# Patient Record
Sex: MALE | Race: WHITE
[De-identification: names, ages, dates, MRNs, and addresses within clinical notes are randomized per-mention and may not be internally consistent; named-entity substitution may affect disease eponyms.]

---

## 2019-09-05 ENCOUNTER — HOSPITAL ENCOUNTER (INPATIENT)
Dept: HOSPITAL 54 - ER | Age: 69
LOS: 4 days | Discharge: PSYCHIATRIC OR SUBSTANCE USE FACILITY | DRG: 689 | End: 2019-09-09
Attending: NURSE PRACTITIONER | Admitting: INTERNAL MEDICINE
Payer: MEDICARE

## 2019-09-05 VITALS — DIASTOLIC BLOOD PRESSURE: 46 MMHG | SYSTOLIC BLOOD PRESSURE: 87 MMHG

## 2019-09-05 VITALS — SYSTOLIC BLOOD PRESSURE: 120 MMHG | DIASTOLIC BLOOD PRESSURE: 72 MMHG

## 2019-09-05 VITALS — WEIGHT: 161.06 LBS | HEIGHT: 70 IN | BODY MASS INDEX: 23.06 KG/M2

## 2019-09-05 VITALS — SYSTOLIC BLOOD PRESSURE: 94 MMHG | DIASTOLIC BLOOD PRESSURE: 57 MMHG

## 2019-09-05 DIAGNOSIS — S00.83XA: ICD-10-CM

## 2019-09-05 DIAGNOSIS — F05: ICD-10-CM

## 2019-09-05 DIAGNOSIS — E51.9: ICD-10-CM

## 2019-09-05 DIAGNOSIS — I25.10: ICD-10-CM

## 2019-09-05 DIAGNOSIS — T31.0: ICD-10-CM

## 2019-09-05 DIAGNOSIS — G93.41: ICD-10-CM

## 2019-09-05 DIAGNOSIS — F19.10: ICD-10-CM

## 2019-09-05 DIAGNOSIS — Z87.891: ICD-10-CM
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
## 2019-09-05 DIAGNOSIS — Z79.899: ICD-10-CM

## 2019-09-05 DIAGNOSIS — D69.6: ICD-10-CM

## 2019-09-05 DIAGNOSIS — F31.9: ICD-10-CM

## 2019-09-05 DIAGNOSIS — T67.0XXS: ICD-10-CM

## 2019-09-05 DIAGNOSIS — B96.1: ICD-10-CM

## 2019-09-05 DIAGNOSIS — Z86.73: ICD-10-CM

## 2019-09-05 DIAGNOSIS — K21.9: ICD-10-CM

## 2019-09-05 DIAGNOSIS — F03.91: ICD-10-CM

## 2019-09-05 DIAGNOSIS — I10: ICD-10-CM

## 2019-09-05 DIAGNOSIS — Z73.6: ICD-10-CM
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
## 2019-09-05 DIAGNOSIS — L89.159: ICD-10-CM

## 2019-09-05 DIAGNOSIS — Z82.49: ICD-10-CM

## 2019-09-05 DIAGNOSIS — N39.0: Primary | ICD-10-CM
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
## 2019-09-05 DIAGNOSIS — T25.029S: ICD-10-CM

## 2019-09-05 LAB
ALBUMIN SERPL BCP-MCNC: 2.6 G/DL (ref 3.4–5)
ALP SERPL-CCNC: 81 U/L (ref 46–116)
ALT SERPL W P-5'-P-CCNC: 32 U/L (ref 12–78)
APPEARANCE UR: (no result)
AST SERPL W P-5'-P-CCNC: 32 U/L (ref 15–37)
BASOPHILS # BLD AUTO: 0.1 /CMM (ref 0–0.2)
BASOPHILS NFR BLD AUTO: 1.1 % (ref 0–2)
BILIRUB DIRECT SERPL-MCNC: 0.3 MG/DL (ref 0–0.2)
BILIRUB SERPL-MCNC: 0.8 MG/DL (ref 0.2–1)
BILIRUB UR QL STRIP: (no result)
BUN SERPL-MCNC: 9 MG/DL (ref 7–18)
CALCIUM SERPL-MCNC: 8.6 MG/DL (ref 8.5–10.1)
CHLORIDE SERPL-SCNC: 103 MMOL/L (ref 98–107)
CO2 SERPL-SCNC: 26 MMOL/L (ref 21–32)
COLOR UR: (no result)
CREAT SERPL-MCNC: 1.1 MG/DL (ref 0.6–1.3)
EOSINOPHIL NFR BLD AUTO: 0.5 % (ref 0–6)
ETHANOL SERPL-MCNC: < 3 MG/DL (ref 0–0)
GLUCOSE SERPL-MCNC: 102 MG/DL (ref 74–106)
GLUCOSE UR STRIP-MCNC: NEGATIVE MG/DL
HCT VFR BLD AUTO: 39 % (ref 39–51)
HGB BLD-MCNC: 12.9 G/DL (ref 13.5–17.5)
HGB UR QL STRIP: (no result) ERY/UL
KETONES UR STRIP-MCNC: (no result) MG/DL
LEUKOCYTE ESTERASE UR QL STRIP: (no result)
LYMPHOCYTES NFR BLD AUTO: 1.3 /CMM (ref 0.8–4.8)
LYMPHOCYTES NFR BLD AUTO: 14.5 % (ref 20–44)
MCHC RBC AUTO-ENTMCNC: 34 G/DL (ref 31–36)
MCV RBC AUTO: 90 FL (ref 80–96)
MONOCYTES NFR BLD AUTO: 0.9 /CMM (ref 0.1–1.3)
MONOCYTES NFR BLD AUTO: 9.8 % (ref 2–12)
NEUTROPHILS # BLD AUTO: 6.5 /CMM (ref 1.8–8.9)
NEUTROPHILS NFR BLD AUTO: 74.1 % (ref 43–81)
NITRITE UR QL STRIP: POSITIVE
PH UR STRIP: 6 [PH] (ref 5–8)
PLATELET # BLD AUTO: 170 /CMM (ref 150–450)
POTASSIUM SERPL-SCNC: 4.1 MMOL/L (ref 3.5–5.1)
PROT SERPL-MCNC: 6.4 G/DL (ref 6.4–8.2)
PROT UR QL STRIP: (no result) MG/DL
RBC # BLD AUTO: 4.3 MIL/UL (ref 4.5–6)
RBC #/AREA URNS HPF: (no result) /HPF (ref 0–2)
SODIUM SERPL-SCNC: 138 MMOL/L (ref 136–145)
UROBILINOGEN UR STRIP-MCNC: 0.2 EU/DL
WBC #/AREA URNS HPF: (no result) /HPF (ref 0–3)
WBC NRBC COR # BLD AUTO: 8.8 K/UL (ref 4.3–11)

## 2019-09-05 PROCEDURE — A6253 ABSORPT DRG > 48 SQ IN W/O B: HCPCS

## 2019-09-05 PROCEDURE — A4216 STERILE WATER/SALINE, 10 ML: HCPCS

## 2019-09-05 PROCEDURE — A6403 STERILE GAUZE>16 <= 48 SQ IN: HCPCS

## 2019-09-05 PROCEDURE — G0480 DRUG TEST DEF 1-7 CLASSES: HCPCS

## 2019-09-05 PROCEDURE — G0378 HOSPITAL OBSERVATION PER HR: HCPCS

## 2019-09-05 RX ADMIN — AMLODIPINE BESYLATE SCH MG: 10 TABLET ORAL at 15:30

## 2019-09-05 RX ADMIN — FAMOTIDINE SCH MG: 20 TABLET, FILM COATED ORAL at 16:13

## 2019-09-05 RX ADMIN — PANTOPRAZOLE SODIUM SCH MG: 40 TABLET, DELAYED RELEASE ORAL at 07:30

## 2019-09-05 RX ADMIN — Medication SCH MG: at 16:13

## 2019-09-05 RX ADMIN — Medication SCH MG: at 09:00

## 2019-09-05 RX ADMIN — VALPROIC ACID SCH MG: 250 SOLUTION ORAL at 16:12

## 2019-09-05 RX ADMIN — ALBUTEROL SULFATE SCH MG: 2.5 SOLUTION RESPIRATORY (INHALATION) at 19:30

## 2019-09-05 RX ADMIN — FOLIC ACID SCH MG: 1 TABLET ORAL at 16:12

## 2019-09-05 NOTE — NUR
MS RN NOTE



RECEIVED PT IN STABLE CONDITION, A/O X1, IN ROOM WITH WIFE AND 1:1 SITTER. PT CURRENTLY 
AGITATED, AND WALKING AROUND ROOM. NO SIGNS OF SOB OR DISTRESS, NO INDICATIONS OF PAIN. PER 
REPORT FROM RN, PT DOES NOT HAVE IV ACCESS, MD AWARE. ALL CURRENT NEEDS MET. PTS. BED 
REMAINS LOW, LOCKED, SAFETY PRECAUTIONS IN PLACE. WILL CONT. TO MONITOR.

## 2019-09-05 NOTE — NUR
MS RN NOTES

PATIENT BECOMES AGITATED AND IV ACCESS GOT PULLED OUT ACCIDENTALLY BY PATIENT AND UNABLE TO 
INSERT NEW IV  ACCESS AT THIS TIME NOTIFIED DR CLARICE DURÁN WITH NEW ORDERS FOR HALDOL 
LACTATE 0.5 MG IM ONCE. ORDER READ BACK COMPLETE. NOTED AND CARRIED OUT.

## 2019-09-05 NOTE — NUR
RN medsurg admission notes

Pt is arrived at the unit with a gurney from ER. Pt is 69 Y O Male with a diagnose of 
Mechanical fall by Dr. Henriquez. Pt is altered. Pt's wife at the bedside. Respiration is 
normal. No SOB. No S/S of distress noted. IV sites at Right forearm # 20 is clean, intact 
and flush without resistance. Skin assessment done and pictures taken and placed at Pt's 
chart. Pt's belonging checked by EDWARD Collins. Contact precautions is maintained for C-Diff. 
Safety precautions is maintained. Bed at low position, brakes locked, side rails upX3, and 
call light is within reach. Will continue to monitor.

## 2019-09-05 NOTE — NUR
MS RN NOTE



PER PT WIFE, HALDOL DOES NOT WORK FOR PT. SHE DOES NOT WANT TO GIVE THE HALDOL. RISKS AND 
BENEFITS MADE AWARE, WITH VERBALIZATION OF UNDERSTANDING.

## 2019-09-05 NOTE — NUR
RN medsurg closing notes

Pt is resting in bed comfortably. Pt's wife at the bed side. Respiration is normal. No SOB. 
No S/S of distress noted. IV sites at R forearm is clean, intact, patent, and SL. Contact 
precautions is maintained for C-Diff. Safety precautions is maintained. Bed at low position, 
call light is within reach. Will endorse to morning nurse for ASHANTI.

## 2019-09-05 NOTE — NUR
MS RN NOTES

PATIENT IN BED EYES CLOSED, AROUSE TO TACTILE STIMULI. NO ACUTE DISTRESS NOTED, BREATHING 
UNLABORED. IV ACCESS PATENT AND INTACT, NO REDNESS OR SWELLING NOTED. SAFETY MEASURES IN 
PLACE. CALL LIGHT WITHIN REACH WILL CONTINUE TO MONITOR ACCORDINGLY.

## 2019-09-05 NOTE — NUR
MS RN NOTES

WIFE BRITTANY AT BEDSIDE REFUSED HALDOL ADMINISTRATION FOR NOW DESPITE OF EXPLANATION OF 
RISKS AND BENEFITS.

## 2019-09-05 NOTE — NUR
MS RN NOTE



SPOKE WITH BRITTANY (WIFE), CONTINUES TO REFUSE HALDOL ADMINISTRATION. RISKS AND BENEFITS 
MADE AWARE WITH VERBALIZATION OF UNDERSTANDING.

## 2019-09-05 NOTE — NUR
MS RN NOTES

PATIENT SLEEPING AROUSE TO TACTILE STIMULI, OFFERED MEDICATION DUE, PATIENT TOO SLEEPY WENT 
BACK TO BED.

## 2019-09-05 NOTE — NUR
MS RN NOTES

PATIENT LYING IN BED, SITTER AND WIFE AT BED STILL AGITATED. NO ACUTE DISTRESS NOTED, 
BREATHING UNLABORED. NO IV ACCESS AT THIS TIME, DR CLARICE DURÁN AWARE. SAFETY MEASURES IN 
PLACE. CALL LIGHT WITHIN REACH. WILL CONTINUE TO MONITOR ACCORDINGLY AND WILL ENDORSE TO 
NIGHT NURSE FOR CONTINUITY OF CARE.

## 2019-09-06 LAB
BASOPHILS # BLD AUTO: 0.1 /CMM (ref 0–0.2)
BASOPHILS NFR BLD AUTO: 0.8 % (ref 0–2)
BUN SERPL-MCNC: 14 MG/DL (ref 7–18)
CALCIUM SERPL-MCNC: 8.6 MG/DL (ref 8.5–10.1)
CHLORIDE SERPL-SCNC: 104 MMOL/L (ref 98–107)
CHOLEST SERPL-MCNC: 126 MG/DL (ref ?–200)
CO2 SERPL-SCNC: 27 MMOL/L (ref 21–32)
CREAT SERPL-MCNC: 0.8 MG/DL (ref 0.6–1.3)
EOSINOPHIL NFR BLD AUTO: 2 % (ref 0–6)
GLUCOSE SERPL-MCNC: 92 MG/DL (ref 74–106)
HCT VFR BLD AUTO: 38 % (ref 39–51)
HDLC SERPL-MCNC: 16 MG/DL (ref 40–60)
HGB BLD-MCNC: 12.8 G/DL (ref 13.5–17.5)
LDLC SERPL DIRECT ASSAY-MCNC: 96 MG/DL (ref 0–99)
LYMPHOCYTES NFR BLD AUTO: 1.6 /CMM (ref 0.8–4.8)
LYMPHOCYTES NFR BLD AUTO: 25.9 % (ref 20–44)
MAGNESIUM SERPL-MCNC: 2.3 MG/DL (ref 1.8–2.4)
MCHC RBC AUTO-ENTMCNC: 34 G/DL (ref 31–36)
MCV RBC AUTO: 90 FL (ref 80–96)
MONOCYTES NFR BLD AUTO: 0.7 /CMM (ref 0.1–1.3)
MONOCYTES NFR BLD AUTO: 11.7 % (ref 2–12)
NEUTROPHILS # BLD AUTO: 3.6 /CMM (ref 1.8–8.9)
NEUTROPHILS NFR BLD AUTO: 59.6 % (ref 43–81)
PHOSPHATE SERPL-MCNC: 3.8 MG/DL (ref 2.5–4.9)
PLATELET # BLD AUTO: 190 /CMM (ref 150–450)
POTASSIUM SERPL-SCNC: 4 MMOL/L (ref 3.5–5.1)
RBC # BLD AUTO: 4.28 MIL/UL (ref 4.5–6)
SODIUM SERPL-SCNC: 140 MMOL/L (ref 136–145)
TRIGL SERPL-MCNC: 154 MG/DL (ref 30–150)
TSH SERPL DL<=0.005 MIU/L-ACNC: 1.16 UIU/ML (ref 0.36–3.74)
WBC NRBC COR # BLD AUTO: 6 K/UL (ref 4.3–11)

## 2019-09-06 RX ADMIN — VALPROIC ACID SCH MG: 250 SOLUTION ORAL at 08:33

## 2019-09-06 RX ADMIN — Medication SCH MG: at 13:48

## 2019-09-06 RX ADMIN — PANTOPRAZOLE SODIUM SCH MG: 40 TABLET, DELAYED RELEASE ORAL at 08:33

## 2019-09-06 RX ADMIN — FAMOTIDINE SCH MG: 20 TABLET, FILM COATED ORAL at 08:32

## 2019-09-06 RX ADMIN — VALPROIC ACID SCH MG: 250 SOLUTION ORAL at 16:58

## 2019-09-06 RX ADMIN — FOLIC ACID SCH MG: 1 TABLET ORAL at 08:33

## 2019-09-06 RX ADMIN — Medication SCH MG: at 08:33

## 2019-09-06 RX ADMIN — AMLODIPINE BESYLATE SCH MG: 10 TABLET ORAL at 08:32

## 2019-09-06 RX ADMIN — ALBUTEROL SULFATE SCH MG: 2.5 SOLUTION RESPIRATORY (INHALATION) at 20:19

## 2019-09-06 RX ADMIN — ALBUTEROL SULFATE SCH MG: 2.5 SOLUTION RESPIRATORY (INHALATION) at 07:35

## 2019-09-06 RX ADMIN — ALBUTEROL SULFATE SCH MG: 2.5 SOLUTION RESPIRATORY (INHALATION) at 01:30

## 2019-09-06 RX ADMIN — ALBUTEROL SULFATE SCH MG: 2.5 SOLUTION RESPIRATORY (INHALATION) at 14:30

## 2019-09-06 RX ADMIN — FAMOTIDINE SCH MG: 20 TABLET, FILM COATED ORAL at 16:58

## 2019-09-06 RX ADMIN — Medication SCH MG: at 16:58

## 2019-09-06 NOTE — NUR
RN MS OPENING NOTES



Patient erceived on room air, no sob noted, no s/s of pain at this time.  Patient remains 
awake and non agitated.  1:1 with male sitter at all times.  Patient remains with no IV line 
at this time, MD aware.  Bed at the lowest setting, call light within reach.

## 2019-09-06 NOTE — NUR
MS RN NOTES



RECEIVED PT IN BED AWAKE WITH FAMILY AT BEDSIDE AS WELL AS 1:1 SITTER.  PT A/O X1.  
RESPIRATIONS EVEN AND UNLABORED WITH NO S/S OF ACUTE DISTRESS OR SOB NOTED.  NO COMPLAINTS 
OF PAIN AT THIS TIME.  PT WITHOUT IV ACCESS, MD AWARE.  SAFETY MEASURE IN PLACE WITH BED IN 
LOWEST LOCKED POSITION WITH SIDE RAILS UP X3.  CALL LIGHT WITHIN REACH.  WILL CONTINUE TO 
MONITOR.

## 2019-09-06 NOTE — NUR
RN MS CLOSING NOTES



Patient remains on room aur, no sob noted, patient is awake and sometimes becomes agitated.  
No IV access because patient removes it.  medications taken by patient.  Bed at the lowest 
setting, call light within reach.  Will give report to NOC RN for ASHANTI bedside.

## 2019-09-06 NOTE — NUR
MS RN NOTE



PT IN STABLE CONDITION, A/O X1, IN ROOM WITH 1:1 SITTER. PT CURRENTLY AWAKE, AGITATED. NO 
SIGNS OF SOB OR DISTRESS, NO INDICATIONS OF PAIN. NO IV ACCESS, MD AWARE. ALL CURRENT NEEDS 
MET. PTS. BED REMAINS LOW, LOCKED, SAFETY PRECAUTIONS IN PLACE. WILL CONT. TO MONITOR AND 
ENDORSE TO NEXT SHIFT FOR ASHANTI.

## 2019-09-06 NOTE — NUR
RN MS NOTES



Patient removed part of his wound from his Left leg.  Eschar was pulled out by patient.

## 2019-09-06 NOTE — NUR
WOUND CARE CONSULT: PT PRESENTS WITH MULTIPLE WOUNDS AND SKIN ISSUES INCLUDING RAISED AREA 
TO FOREHEAD, LARGE WOUND WITH YELLOW SLOUGH TO SACRAL AREA EXTENDING TO BUTTOCKS, LEFT WRIST 
WOUND AND ESCHARS TO LEFT LOWER LEG AND HEELS, PRESENT ON ADMISSION. RECOMMEND SURGICAL 
CONSULT AND DPM CONSULT. DR BRETT CASAREZ NOTIFIED OF SURGICAL CONSULT REQUEST. DR REN 
AWARE OF DPM CONSULT REQUEST. RECOMMENDATIONS MADE FOR WOUND CARE AND SKIN PROTECTION. 
DISCUSSED WITH NURSING STAFF. DEFER TO DPM FOR LOWER EXTREMITY WOUNDS. WILL SEE PRN. ISOFLEX 
LOW AIRLOSS BED TO BE PLACED WHEN AVAILABLE. MD IN AGREEMENT WITH PLAN OF CARE. PT MOVES 
ALMOST CONSTANTLY. PT IS INCONTINENT. 

-------------------------------------------------------------------------------

Addendum: 09/06/19 at 1048 by LOAN REDDY WNDNU

-------------------------------------------------------------------------------

Amended: Links added.

## 2019-09-07 VITALS — DIASTOLIC BLOOD PRESSURE: 68 MMHG | SYSTOLIC BLOOD PRESSURE: 121 MMHG

## 2019-09-07 VITALS — SYSTOLIC BLOOD PRESSURE: 129 MMHG | DIASTOLIC BLOOD PRESSURE: 77 MMHG

## 2019-09-07 VITALS — DIASTOLIC BLOOD PRESSURE: 62 MMHG | SYSTOLIC BLOOD PRESSURE: 123 MMHG

## 2019-09-07 RX ADMIN — BENZTROPINE MESYLATE SCH MG: 1 TABLET ORAL at 09:10

## 2019-09-07 RX ADMIN — Medication SCH MG: at 16:34

## 2019-09-07 RX ADMIN — Medication SCH MG: at 09:10

## 2019-09-07 RX ADMIN — VALPROIC ACID SCH MG: 250 SOLUTION ORAL at 16:33

## 2019-09-07 RX ADMIN — ALBUTEROL SULFATE SCH MG: 2.5 SOLUTION RESPIRATORY (INHALATION) at 07:34

## 2019-09-07 RX ADMIN — FOLIC ACID SCH MG: 1 TABLET ORAL at 09:09

## 2019-09-07 RX ADMIN — FAMOTIDINE SCH MG: 20 TABLET, FILM COATED ORAL at 16:34

## 2019-09-07 RX ADMIN — FAMOTIDINE SCH MG: 20 TABLET, FILM COATED ORAL at 09:09

## 2019-09-07 RX ADMIN — ALBUTEROL SULFATE SCH MG: 2.5 SOLUTION RESPIRATORY (INHALATION) at 12:55

## 2019-09-07 RX ADMIN — MUPIROCIN SCH GM: 20 OINTMENT TOPICAL at 20:36

## 2019-09-07 RX ADMIN — ALBUTEROL SULFATE SCH MG: 2.5 SOLUTION RESPIRATORY (INHALATION) at 19:58

## 2019-09-07 RX ADMIN — BENZTROPINE MESYLATE SCH MG: 1 TABLET ORAL at 16:33

## 2019-09-07 RX ADMIN — VALPROIC ACID SCH MG: 250 SOLUTION ORAL at 09:09

## 2019-09-07 RX ADMIN — AMLODIPINE BESYLATE SCH MG: 10 TABLET ORAL at 09:16

## 2019-09-07 RX ADMIN — Medication SCH MG: at 09:35

## 2019-09-07 RX ADMIN — PANTOPRAZOLE SODIUM SCH MG: 40 TABLET, DELAYED RELEASE ORAL at 09:10

## 2019-09-07 RX ADMIN — Medication SCH MG: at 09:09

## 2019-09-07 RX ADMIN — ALBUTEROL SULFATE SCH MG: 2.5 SOLUTION RESPIRATORY (INHALATION) at 02:12

## 2019-09-07 RX ADMIN — LEVOFLOXACIN SCH MG: 500 TABLET, FILM COATED ORAL at 10:45

## 2019-09-07 NOTE — NUR
MS RN OPENING NOTE 



RECEIVED PATIENT IN BED. A/O X1. TOLERATING ROOM AIR. RESPIRATIONS ARE EVEN AND UNLABORED. 
NO SIGNS OF SOB. DENIES PAIN AT THIS TIME. NO IV ACCESS. MALE SITTER FOR 1:1. FAMILY AT THE 
BEDSIDE. BED IS LOW ANS LOCKED, SIDE RAILS UP X2. CALL LIGHT WITHIN REACH. WILL CONTINUE TO 
MONITOR.

## 2019-09-07 NOTE — NUR
MS RN NOTES



PT IN BED AWAKE WITH 1:1 SITTER AT BEDSIDE.  PT A/O X1.  RESPIRATIONS EVEN AND UNLABORED 
WITH NO S/S OF ACUTE DISTRESS OR SOB NOTED THROUGHOUT SHIFT.  PT KEPT CLEAN, DRY, AND 
COMFORTABLE.  PT TURNED Q2 HRS.  NO COMPLAINTS OF PAIN AT THIS TIME.  PT WITHOUT IV ACCESS, 
MD AWARE.  SAFETY MEASURE IN PLACE WITH BED IN LOWEST LOCKED POSITION WITH SIDE RAILS UP X3. 
 CALL LIGHT WITHIN REACH.  WILL ENDORSE TO ONCOMING NURSE FOR ASHANTI.

## 2019-09-07 NOTE — NUR
RN MS CLOSING NOTES



Patient remains on room air, no sob noted, vital signs stable.  Patient awake, and becomes 
agitated at times.  No IV line present MD aware.  Patient with 1:1 male sitter 24 hours a 
day.  Patient able to ambulate with assistance from the sitter.  Patient able to sleep today 
for a few hours.  Bed at the lowest setting, call light within reach, side rails up x3.  
Will give report to RN NOC bedside for ASHANTI.

## 2019-09-07 NOTE — NUR
RN MS OPENING NOTES



Received patient on room air, no sob noted, no s/s of pain at this time.  Patient remains 
with a 1:1 sitter.  Patient shows no agitation at this time.  NO IV line.  Bed at the lowest 
setting, call light within reach.

## 2019-09-08 VITALS — DIASTOLIC BLOOD PRESSURE: 47 MMHG | SYSTOLIC BLOOD PRESSURE: 96 MMHG

## 2019-09-08 LAB
BASOPHILS # BLD AUTO: 0 /CMM (ref 0–0.2)
BASOPHILS NFR BLD AUTO: 0.6 % (ref 0–2)
BUN SERPL-MCNC: 10 MG/DL (ref 7–18)
CALCIUM SERPL-MCNC: 8.2 MG/DL (ref 8.5–10.1)
CHLORIDE SERPL-SCNC: 107 MMOL/L (ref 98–107)
CO2 SERPL-SCNC: 30 MMOL/L (ref 21–32)
CREAT SERPL-MCNC: 0.6 MG/DL (ref 0.6–1.3)
EOSINOPHIL NFR BLD AUTO: 2.2 % (ref 0–6)
GLUCOSE SERPL-MCNC: 108 MG/DL (ref 74–106)
HCT VFR BLD AUTO: 36 % (ref 39–51)
HGB BLD-MCNC: 12 G/DL (ref 13.5–17.5)
LYMPHOCYTES NFR BLD AUTO: 1.3 /CMM (ref 0.8–4.8)
LYMPHOCYTES NFR BLD AUTO: 24.4 % (ref 20–44)
MCHC RBC AUTO-ENTMCNC: 34 G/DL (ref 31–36)
MCV RBC AUTO: 89 FL (ref 80–96)
MONOCYTES NFR BLD AUTO: 0.7 /CMM (ref 0.1–1.3)
MONOCYTES NFR BLD AUTO: 12.9 % (ref 2–12)
NEUTROPHILS # BLD AUTO: 3.3 /CMM (ref 1.8–8.9)
NEUTROPHILS NFR BLD AUTO: 59.9 % (ref 43–81)
PLATELET # BLD AUTO: 213 /CMM (ref 150–450)
POTASSIUM SERPL-SCNC: 3.8 MMOL/L (ref 3.5–5.1)
RBC # BLD AUTO: 4.01 MIL/UL (ref 4.5–6)
SODIUM SERPL-SCNC: 144 MMOL/L (ref 136–145)
WBC NRBC COR # BLD AUTO: 5.5 K/UL (ref 4.3–11)

## 2019-09-08 RX ADMIN — Medication SCH MG: at 08:19

## 2019-09-08 RX ADMIN — AMLODIPINE BESYLATE SCH MG: 10 TABLET ORAL at 08:20

## 2019-09-08 RX ADMIN — PANTOPRAZOLE SODIUM SCH MG: 40 TABLET, DELAYED RELEASE ORAL at 08:19

## 2019-09-08 RX ADMIN — BENZTROPINE MESYLATE SCH MG: 1 TABLET ORAL at 08:20

## 2019-09-08 RX ADMIN — ALBUTEROL SULFATE SCH MG: 2.5 SOLUTION RESPIRATORY (INHALATION) at 08:05

## 2019-09-08 RX ADMIN — ALBUTEROL SULFATE SCH MG: 2.5 SOLUTION RESPIRATORY (INHALATION) at 01:30

## 2019-09-08 RX ADMIN — VALPROIC ACID SCH MG: 250 SOLUTION ORAL at 08:20

## 2019-09-08 RX ADMIN — VALPROIC ACID SCH MG: 250 SOLUTION ORAL at 16:18

## 2019-09-08 RX ADMIN — ALBUTEROL SULFATE SCH MG: 2.5 SOLUTION RESPIRATORY (INHALATION) at 19:59

## 2019-09-08 RX ADMIN — MUPIROCIN SCH APPLIC: 20 OINTMENT TOPICAL at 21:08

## 2019-09-08 RX ADMIN — LEVOFLOXACIN SCH MG: 500 TABLET, FILM COATED ORAL at 09:00

## 2019-09-08 RX ADMIN — FAMOTIDINE SCH MG: 20 TABLET, FILM COATED ORAL at 16:18

## 2019-09-08 RX ADMIN — FOLIC ACID SCH MG: 1 TABLET ORAL at 08:20

## 2019-09-08 RX ADMIN — BENZTROPINE MESYLATE SCH MG: 1 TABLET ORAL at 16:18

## 2019-09-08 RX ADMIN — ALBUTEROL SULFATE SCH MG: 2.5 SOLUTION RESPIRATORY (INHALATION) at 13:41

## 2019-09-08 RX ADMIN — Medication SCH MG: at 16:18

## 2019-09-08 RX ADMIN — MUPIROCIN SCH APPLIC: 20 OINTMENT TOPICAL at 08:19

## 2019-09-08 RX ADMIN — FAMOTIDINE SCH MG: 20 TABLET, FILM COATED ORAL at 08:20

## 2019-09-08 NOTE — NUR
MS/RN NOTES



RECEIVED PT. LYING IN BED. PT. IS AWAKE AND ALERT TO SELF. PT. NEEDS FREQUENT REORIENTING. 
PER PT. WIFE JENNIFER PT. IS HALLUCINATING MORE BELIEVING HE IS AT WORK AND FIXING ELEVATORS. 
BREATHING EVEN AND UNLABORED ON ROOM AIR. NO SOB, RESPIRATORY DISTRESS OR COMPLAINTS OF PAIN 
NOTED AT THIS TIME. PT. HAS NO IV ACCESS PER DAYSHIFT NURSE MD AWARE. PT. WITH 1:1 SITTER 
PRESENT AT BEDSIDE. PT. WIFE PRESENT AT BEDSIDE. SAFETY, ASPIRATION AND ISOLATION 
PRECAUTIONS IMPLEMENTED AND IN PLACE. BED LOCKED AND IN LOWEST POSITION, SIDE RAILS UP X3, 
BED ALARM ON, CALL LIGHT WITHIN REACH, WILL CONTINUE TO MONITOR.

## 2019-09-08 NOTE — NUR
RN OPENING NOTE



PT WAS RECEIVED IN BED AT LOWEST AND LOCKED POSITION WITH SIDE RAILS UPX2, A/O X 1 BREATHING 
EVEN AND UNLABORED WITH NO S/S OF ANY PAIN OR DISTRESS, ON 1:1 SITTER FOR HISTORY OF BEING 
AGGRESSIVE AND AGITATED, NO IV IN PLACE BUT MD AWARE, SAFETY PRECAUTIONS IN PLACE, CALL 
LIGHT WITHIN REACH, WILL MONITOR PT ACCORDINGLY

## 2019-09-08 NOTE — NUR
RN CLOSING NOTE



PT IN BED AT LOWEST AND LOCKED POSITION WITH SIDE RAILS UPX2, A/O X 1 BREATHING EVEN AND 
UNLABORED, NO PAIN OR DISTRESS NOTED AT THIS TIME, SITTER AT BEDSIDE, NO IV IN PLACE MD 
AWARE, SAFETY PRECAUTIONS IN PLACE, CALL LIGHT WITHIN REACH, ALL NEEDS ATTENDED TO, WILL 
ENDORSE TO NIGHT RN FOR ASHANTI.

## 2019-09-09 ENCOUNTER — HOSPITAL ENCOUNTER (INPATIENT)
Dept: HOSPITAL 54 - GPS | Age: 69
LOS: 7 days | Discharge: ANOTHER HOSPITAL | DRG: 885 | End: 2019-09-16
Attending: FAMILY MEDICINE | Admitting: PSYCHIATRY & NEUROLOGY
Payer: MEDICARE

## 2019-09-09 VITALS — HEIGHT: 70 IN | WEIGHT: 142 LBS | BODY MASS INDEX: 20.33 KG/M2

## 2019-09-09 VITALS — SYSTOLIC BLOOD PRESSURE: 123 MMHG | DIASTOLIC BLOOD PRESSURE: 73 MMHG

## 2019-09-09 DIAGNOSIS — Z87.891: ICD-10-CM

## 2019-09-09 DIAGNOSIS — B96.1: ICD-10-CM

## 2019-09-09 DIAGNOSIS — K21.9: ICD-10-CM

## 2019-09-09 DIAGNOSIS — L89.159: ICD-10-CM

## 2019-09-09 DIAGNOSIS — L89.610: ICD-10-CM

## 2019-09-09 DIAGNOSIS — Z86.73: ICD-10-CM

## 2019-09-09 DIAGNOSIS — E87.6: ICD-10-CM

## 2019-09-09 DIAGNOSIS — N39.0: ICD-10-CM

## 2019-09-09 DIAGNOSIS — F29: Primary | ICD-10-CM

## 2019-09-09 DIAGNOSIS — F03.91: ICD-10-CM
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
## 2019-09-09 DIAGNOSIS — I10: ICD-10-CM

## 2019-09-09 DIAGNOSIS — Z82.49: ICD-10-CM

## 2019-09-09 DIAGNOSIS — F32.9: ICD-10-CM

## 2019-09-09 DIAGNOSIS — Z91.19: ICD-10-CM

## 2019-09-09 DIAGNOSIS — G93.41: ICD-10-CM
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
## 2019-09-09 DIAGNOSIS — L89.620: ICD-10-CM

## 2019-09-09 PROCEDURE — A6403 STERILE GAUZE>16 <= 48 SQ IN: HCPCS

## 2019-09-09 PROCEDURE — A4216 STERILE WATER/SALINE, 10 ML: HCPCS

## 2019-09-09 PROCEDURE — A6248 HYDROGEL DRSG GEL FILLER: HCPCS

## 2019-09-09 PROCEDURE — A6253 ABSORPT DRG > 48 SQ IN W/O B: HCPCS

## 2019-09-09 RX ADMIN — Medication SCH MG: at 08:18

## 2019-09-09 RX ADMIN — FOLIC ACID SCH MG: 1 TABLET ORAL at 08:17

## 2019-09-09 RX ADMIN — FAMOTIDINE SCH MG: 20 TABLET, FILM COATED ORAL at 08:18

## 2019-09-09 RX ADMIN — VALPROIC ACID SCH MG: 250 SOLUTION ORAL at 08:18

## 2019-09-09 RX ADMIN — PANTOPRAZOLE SODIUM SCH MG: 40 TABLET, DELAYED RELEASE ORAL at 08:17

## 2019-09-09 RX ADMIN — AMLODIPINE BESYLATE SCH MG: 10 TABLET ORAL at 08:24

## 2019-09-09 RX ADMIN — ALBUTEROL SULFATE SCH MG: 2.5 SOLUTION RESPIRATORY (INHALATION) at 13:43

## 2019-09-09 RX ADMIN — ALBUTEROL SULFATE SCH MG: 2.5 SOLUTION RESPIRATORY (INHALATION) at 07:35

## 2019-09-09 RX ADMIN — Medication SCH MG: at 16:57

## 2019-09-09 RX ADMIN — ALBUTEROL SULFATE SCH MG: 2.5 SOLUTION RESPIRATORY (INHALATION) at 01:30

## 2019-09-09 RX ADMIN — MUPIROCIN SCH APPLIC: 20 OINTMENT TOPICAL at 21:59

## 2019-09-09 RX ADMIN — MUPIROCIN SCH APPLIC: 20 OINTMENT TOPICAL at 08:19

## 2019-09-09 RX ADMIN — BENZTROPINE MESYLATE SCH MG: 1 TABLET ORAL at 08:18

## 2019-09-09 RX ADMIN — BENZTROPINE MESYLATE SCH MG: 1 TABLET ORAL at 16:57

## 2019-09-09 RX ADMIN — ALBUTEROL SULFATE SCH MG: 2.5 SOLUTION RESPIRATORY (INHALATION) at 19:48

## 2019-09-09 RX ADMIN — VALPROIC ACID SCH MG: 250 SOLUTION ORAL at 16:57

## 2019-09-09 RX ADMIN — FAMOTIDINE SCH MG: 20 TABLET, FILM COATED ORAL at 16:57

## 2019-09-09 RX ADMIN — LEVOFLOXACIN SCH MG: 500 TABLET, FILM COATED ORAL at 09:16

## 2019-09-09 NOTE — NUR
MS RN NOTES 



PATIENT AWAKE IN BED WITH NO DISTRESS NOTED. CALL LIGHT WITHIN REACH. NO C/O PAIN OR 
DISCOMFORT. FC INTACT AND PATENT. CONTACT ISOLATION OBSERVED AND MAINTAINED. ROOM FREE OF 
CLUTTER AND BELONGINGS KEPT NEAR BEDSIDE. BED IN LOW LOCK SETTING WITH BED ALARM ON AND 
FUNCTIONING PROPERLY. WILL CONTINUE TO MONITOR.

## 2019-09-09 NOTE — NUR
PATIENT TRANSFERRED VIA WC TO GPS IN STABLE CONDITION. PATIENT ACCOMPANIED BY WIFE. BEDSIDE 
REPORT GIVEN TO BRIDGET.

## 2019-09-09 NOTE — NUR
Admitted a 68 y/o male from [facility] and evaluated at [address] 
hold as GD. Patient admitting Dx. Psychosis. Medical dx. hypertension, dementia, GERD, heat 
stroke, thrombocytopenia. Per hold, patient is confused, anxious, restless and agitation. 
patient has no known allergy. Upon face to face evaluation, patient appeared alert and 
oriented x 1,  anxious, confused, disorganize, anxious, restless, bizarre behavior, 
responsive to internal stimuli, pacing, dishevelled and hard to redirect. Explanation 
provided to the patient and wife, limit settings done and redirected the patient. Explained 
the paper works and patient's wife signed the consents. Informed of visiting hours and unit 
policies. Belongings and contraband checked. Q15 min checks initiated. Care plan started. 
Vital signs checked and recorded. Patient's rights discussed, guide to prescription meds 
handbook provided. Patient  advised of the hold. Notified Dr. Salas of the admission. Will 
monitor patient for mood, safety and behavior. Will endorse to the day shift.

## 2019-09-09 NOTE — NUR
RN NOTES



PATIENT SEEN AND EVALUATED BY CRISIS EVALUATOR ARLEY GARZA RN AND PLACED PT ON 5150 
HOLD STARTING TODAY AT 1841 UNTIL 9/12/2019. PT WILL BE UNDER PSYCH CARE BY DR KOENIG. WILL 
ENDORSE TO NIGHT SHIFT NURSE

## 2019-09-09 NOTE — NUR
MS RN OPENING NOTES



RECEIVED PT AWAKE IN BED IN NO ACUTE SIGNS OF DISTRESS. 1:1 SITTER AT BEDSIDE. A/O X1. 
CONFUSED BUT APPEARS COMFORTABLE WITH NO FACIAL GRIMACING OR MOANING NOTED. ON ROOM AIR,  
BREATHING EVEN AND UNLABORED. NO IV ACCESS, MD AWARE. SAFETY MEASURES IN PLACE. BED IN LOW, 
LOCKED POSITION WITH SIDE RAILS UP AS APPROPRIATE. CALL LIGHT WITHIN REACH. WILL CONTINUE TO 
MONITOR.

## 2019-09-09 NOTE — NUR
MS/RN NOTES



PT. IS LYING IN BED. PT. IS AWAKE AND ALERT TO SELF. BREATHING EVEN AND UNLABORED ON ROOM 
AIR. NO SOB, RESPIRATORY DISTRESS OR COMPLAINTS OF PAIN NOTED AT THIS TIME. PT. HAS NO IV 
ACCESS, MD AWARE.  SAFETY, ASPIRATION AND ISOLATION PRECAUTIONS IMPLEMENTED AND IN PLACE. 
PT. WITH 1:1 SITTER PRESENT AT BEDSIDE. BED LOCKED AND IN LOWEST POSITION, SIDE RAILS UP X3, 
BED ALARM ON, CALL LIGHT WITHIN REACH, WILL ENDORSE TO DAYSHIFT NURSE FOR CONTINUITY OF 
CARE.

## 2019-09-09 NOTE — NUR
MS RN CLOSING NOTES



PT RESTING IN BED AWAKE, ALERT AND ORIENTED X1. VERBALLY RESPONSIVE. CONFUSED ON AND OFF 
DURING THE DAY. WIFE AT BEDSIDE. ON ROOM AIR,  TOLERATING WELL WITH NO ACUTE RESPIRATORY 
DISTRESS NOTED THROUGHOUT THE DAY. CONTACT PRECAUTIONS FOR MRSA OF NARES AND ESBL URINE 
MAINTAINED. NO IV ACCESS, MD AWARE. ALL NEEDS AND CARE PROVIDED WELL. SAFETY MEASURES KEPT 
IN PLACE. 1:1 SITTER AT BEDSIDE. BED IN LOW, LOCKED POSITION WITH SIDE-RAILS UP AS 
APPROPRIATE. CALL LIGHT WITHIN REACH. WILL ENDORSE TO NIGHT SHIFT NURSE FOR ASHANTI

## 2019-09-10 VITALS — DIASTOLIC BLOOD PRESSURE: 80 MMHG | SYSTOLIC BLOOD PRESSURE: 153 MMHG

## 2019-09-10 VITALS — SYSTOLIC BLOOD PRESSURE: 166 MMHG | DIASTOLIC BLOOD PRESSURE: 91 MMHG

## 2019-09-10 LAB
CHOLEST SERPL-MCNC: 153 MG/DL (ref ?–200)
CREAT SERPL-MCNC: 1 MG/DL (ref 0.6–1.3)
HDLC SERPL-MCNC: 25 MG/DL (ref 40–60)
LDLC SERPL DIRECT ASSAY-MCNC: 105 MG/DL (ref 0–99)
TRIGL SERPL-MCNC: 125 MG/DL (ref 30–150)

## 2019-09-10 RX ADMIN — FOLIC ACID SCH MG: 1 TABLET ORAL at 09:13

## 2019-09-10 RX ADMIN — Medication SCH MG: at 09:13

## 2019-09-10 RX ADMIN — ALBUTEROL SULFATE SCH MG: 2.5 SOLUTION RESPIRATORY (INHALATION) at 01:30

## 2019-09-10 RX ADMIN — ACETAMINOPHEN PRN MG: 325 TABLET ORAL at 01:43

## 2019-09-10 RX ADMIN — RISPERIDONE SCH MG: 0.5 TABLET, ORALLY DISINTEGRATING ORAL at 12:31

## 2019-09-10 RX ADMIN — LEVOFLOXACIN SCH MG: 500 TABLET, FILM COATED ORAL at 11:24

## 2019-09-10 RX ADMIN — OXYCODONE HYDROCHLORIDE AND ACETAMINOPHEN SCH MG: 500 TABLET ORAL at 17:23

## 2019-09-10 RX ADMIN — ALBUTEROL SULFATE SCH MG: 2.5 SOLUTION RESPIRATORY (INHALATION) at 07:35

## 2019-09-10 RX ADMIN — THERA TABS SCH UDTAB: TAB at 09:13

## 2019-09-10 RX ADMIN — RISPERIDONE SCH MG: 0.5 TABLET, ORALLY DISINTEGRATING ORAL at 17:23

## 2019-09-10 RX ADMIN — AMLODIPINE BESYLATE SCH MG: 10 TABLET ORAL at 09:00

## 2019-09-10 RX ADMIN — SODIUM HYPOCHLORITE SCH ML: 1.25 SOLUTION TOPICAL at 11:00

## 2019-09-10 RX ADMIN — Medication SCH OZ: at 09:13

## 2019-09-10 RX ADMIN — OXYCODONE HYDROCHLORIDE AND ACETAMINOPHEN SCH MG: 500 TABLET ORAL at 09:13

## 2019-09-10 RX ADMIN — BENZTROPINE MESYLATE SCH MG: 1 TABLET ORAL at 17:22

## 2019-09-10 RX ADMIN — FAMOTIDINE SCH MG: 20 TABLET, FILM COATED ORAL at 17:23

## 2019-09-10 RX ADMIN — MUPIROCIN SCH GM: 20 OINTMENT TOPICAL at 21:03

## 2019-09-10 RX ADMIN — ALBUTEROL SULFATE SCH MG: 2.5 SOLUTION RESPIRATORY (INHALATION) at 13:13

## 2019-09-10 RX ADMIN — Medication SCH GM: at 10:00

## 2019-09-10 RX ADMIN — FAMOTIDINE SCH MG: 20 TABLET, FILM COATED ORAL at 09:13

## 2019-09-10 RX ADMIN — TEMAZEPAM PRN MG: 7.5 CAPSULE ORAL at 01:41

## 2019-09-10 RX ADMIN — TEMAZEPAM PRN MG: 7.5 CAPSULE ORAL at 23:55

## 2019-09-10 RX ADMIN — ALBUTEROL SULFATE SCH MG: 2.5 SOLUTION RESPIRATORY (INHALATION) at 19:30

## 2019-09-10 NOTE — NUR
RN OPENING NOTES

RECEIVED REPORT FROM STELLA HERNANDZE. PATIENT A/A/O X1-2 TO SELF & PLACE BUT WITH CONFUSION AND 
RESTLESSNESS NOTED. BREATHING EVEN & UNLABORED, TOLERATING ROOM AIR. NO S/S OF RESPIRATORY 
DISTRESS NOTED. RADIAL PULSES PRESENT. NO IV SITE @ THIS TIME. DENIES ANY PAIN OR DISCOMFORT 
@ THIS TIME. SAFETY MEASURES IN PLACE W/ SIDE RAILS UP & SITTER @ BEDSIDE FOR CLOSE 
OBSERVATION. 5150 HOLD STILL IN PLACE. WILL CONTINUE TO MONITOR.

## 2019-09-10 NOTE — NUR
WOUND CARE CONSULT: PT PRESENTS WITH MULTIPLE WOUNDS, PRESENT ON ADMISSION INCLUDING 
NECROTIC WOUND TO SACRUM WHICH EXTENDS TO BUTTOCKS. RECOMMEND SURGICAL AND DPM CONSULTS. DR BRETT CASAREZ AND DR LOPEZ NOTIFIED OF CONSULT REQUESTS. PT ON ISOFLEX LOW AIRLOSS BED. 
ALL SKIN PROTECTION RECOMMENDATIONS AND WOUND CARE RECOMMENDATIONS FOR SACRAL WOUND 
DISCUSSED WITH NURSING STAFF. DEFER TO DPM FOR LOWER EXTREMITY WOUNDS. WILL SEE GAGE HANSEN IN 
AGREEMENT WITH PLAN OF CARE. 

-------------------------------------------------------------------------------

Addendum: 09/10/19 at 0849 by LOAN REDDY WNDNU

-------------------------------------------------------------------------------

Amended: Links added.

## 2019-09-10 NOTE — NUR
SW contacted [facility] Address: [address] , [place] 
[address] Phone: [phone] and spoke with Marilee admissions coordinator who stated that pt 
is able to return if pts wife agreed to pt returning.
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
## 2019-09-10 NOTE — NUR
GPS/RN - End of shift summary

No significant change in condition, remain confused, restless, agitated, keeps on removing 
his wound dressings. Wife at bedside updated on pt's condition. Will continue with current 
plan of care.

## 2019-09-10 NOTE — NUR
GPS/RN - Assessment

Patient in bed, alert to self, confused and disoriented, reality orientation provided, no 
s/s of pain, stable on room air, afebrile, anxious, agitated, restless. Wound care consulted 
for multiple skin breakdown, see skin assessment. Fall, aspiration and contact precautions 
maintained for ESBL urine and MRSA nares. All needs attended. Sitter at bedside for close 
monitoring. Will continue to monitor q15 mins for safety and behavior.

## 2019-09-11 RX ADMIN — ALBUTEROL SULFATE SCH MG: 2.5 SOLUTION RESPIRATORY (INHALATION) at 19:08

## 2019-09-11 RX ADMIN — FAMOTIDINE SCH MG: 20 TABLET, FILM COATED ORAL at 09:16

## 2019-09-11 RX ADMIN — AMLODIPINE BESYLATE SCH MG: 10 TABLET ORAL at 09:16

## 2019-09-11 RX ADMIN — FOLIC ACID SCH MG: 1 TABLET ORAL at 09:16

## 2019-09-11 RX ADMIN — Medication SCH MG: at 09:16

## 2019-09-11 RX ADMIN — FAMOTIDINE SCH MG: 20 TABLET, FILM COATED ORAL at 17:29

## 2019-09-11 RX ADMIN — TEMAZEPAM PRN MG: 7.5 CAPSULE ORAL at 23:00

## 2019-09-11 RX ADMIN — THERA TABS SCH UDTAB: TAB at 09:16

## 2019-09-11 RX ADMIN — BENZTROPINE MESYLATE SCH MG: 1 TABLET ORAL at 09:16

## 2019-09-11 RX ADMIN — MUPIROCIN SCH APPLIC: 20 OINTMENT TOPICAL at 21:28

## 2019-09-11 RX ADMIN — ALBUTEROL SULFATE SCH MG: 2.5 SOLUTION RESPIRATORY (INHALATION) at 01:03

## 2019-09-11 RX ADMIN — Medication SCH OZ: at 09:57

## 2019-09-11 RX ADMIN — OXYCODONE HYDROCHLORIDE AND ACETAMINOPHEN SCH MG: 500 TABLET ORAL at 17:30

## 2019-09-11 RX ADMIN — Medication SCH APPLIC: at 09:57

## 2019-09-11 RX ADMIN — ALBUTEROL SULFATE SCH MG: 2.5 SOLUTION RESPIRATORY (INHALATION) at 13:30

## 2019-09-11 RX ADMIN — RISPERIDONE SCH MG: 0.5 TABLET, ORALLY DISINTEGRATING ORAL at 09:16

## 2019-09-11 RX ADMIN — SODIUM HYPOCHLORITE SCH ML: 1.25 SOLUTION TOPICAL at 09:56

## 2019-09-11 RX ADMIN — OXYCODONE HYDROCHLORIDE AND ACETAMINOPHEN SCH MG: 500 TABLET ORAL at 09:16

## 2019-09-11 RX ADMIN — RISPERIDONE SCH MG: 0.5 TABLET, ORALLY DISINTEGRATING ORAL at 12:44

## 2019-09-11 RX ADMIN — ALBUTEROL SULFATE SCH MG: 2.5 SOLUTION RESPIRATORY (INHALATION) at 07:35

## 2019-09-11 RX ADMIN — LEVOFLOXACIN SCH MG: 500 TABLET, FILM COATED ORAL at 12:44

## 2019-09-11 RX ADMIN — ATORVASTATIN CALCIUM SCH MG: 10 TABLET, FILM COATED ORAL at 21:29

## 2019-09-11 RX ADMIN — RISPERIDONE SCH MG: 0.5 TABLET, ORALLY DISINTEGRATING ORAL at 17:30

## 2019-09-11 RX ADMIN — MUPIROCIN SCH APPLIC: 20 OINTMENT TOPICAL at 09:55

## 2019-09-11 RX ADMIN — BENZTROPINE MESYLATE SCH MG: 1 TABLET ORAL at 17:30

## 2019-09-11 NOTE — NUR
RN NOTES

PATIENT RESTLESS AND ANXIOUS FOR MOST OF THE NIGHT & TAKING OFF WOUND DRESSINGS. PRN 
KLONOPIN GIVEN X2 & PRN RESTORIL GIVEN @ BEDTIME BUT STILL NOTED W/ RESTLESSNESS.

## 2019-09-11 NOTE — NUR
GPS OV RN NOTES



PATIENT LEFT UNIT FOR CT SCAN. LEFT IN STABLE CONDITION. NO ACUTE DISTRESS. NO C/O PAIN OR 
DISCOMFORT

## 2019-09-11 NOTE — NUR
GPS OVERFLOW RN NOTE:



PATIENT AGITATED AND RESTLESS, KLONIPIN 0.5MG ORAL GIVEN PER MD ORDER. WILL CONTINUE TO 
MONITOR.

## 2019-09-11 NOTE — NUR
GPS OV RN NOTES



PATIENT BACK AT UNIT. ASSISTED BACK TO BED TO A COMFORTABLE POSITION. PATIENT SLEEPING, 
EASILY AROUSABLE THROUGH VERBAL AND TACTILE STIMULI. WIFE AND SITTER AT BEDSIDE. MAINTAINED 
ISOLATION PRECAUTIONS. PATIENT KEPT CLEAN, DRY AND COMFORTABLE. WILL ENDORSE TO INCOMING 
SHIFT FOR ASHANTI

## 2019-09-11 NOTE — NUR
GPS OVERFLOW RN NOTE:



PATIENT AGITATED AND RESTLESS, PATIENT WIFE REQUESTING FOR SLEEPING MEDICATION. RESTORIL 
7.5MG ORAL GIVEN PER MD ORDER. WILL CONTINUE TO MONITOR.

## 2019-09-11 NOTE — NUR
GPS OVERFLOW RN NOTE:



PATIENT RESTING IN BED, NO ACUTE DISTRESS NOTED, WIFE AT BEDSIDE. BREATHING EVEN AND 
UNLABORED, NO SOB NOTED. PATIENT CALM AT THIS TIME. SITTER AT BEDSIDE. ISOLATION PRECAUTION 
OBSERVED. BED LOCKED AND IN LOWEST POSITION, CALL LIGHT IN REACH. WILL CONTINUE TO MONITOR

## 2019-09-11 NOTE — NUR
GPS OVERFLOW RN NOTES



PATIENT RECEIVED RESTING INSIDE ROOM. SLEEPING, EASILY AROUSABLE THROUGH VERBAL AND TACTILE 
STIMULI. PATIENT WITH DISORGANIZED THOUGHT PROCESS. BREATHING EVEN AND UNLABORED. NO ACUTE 
DISTRESS. DENIES ANY PAIN OR DISCOMFORT. SITTER AT BEDSIDE. MAINTAINED ISOLATION 
PRECAUTIONS. WILL CONTINUE TO MONITOR. BED LOCKED AND IN LOW POSITION. BILATERAL UPPER SIDE 
RAILS UP AND LOCKED. CALL LIGHT WITHIN EASY REACH

## 2019-09-12 VITALS — SYSTOLIC BLOOD PRESSURE: 129 MMHG | DIASTOLIC BLOOD PRESSURE: 75 MMHG

## 2019-09-12 VITALS — DIASTOLIC BLOOD PRESSURE: 86 MMHG | SYSTOLIC BLOOD PRESSURE: 105 MMHG

## 2019-09-12 VITALS — DIASTOLIC BLOOD PRESSURE: 78 MMHG | SYSTOLIC BLOOD PRESSURE: 136 MMHG

## 2019-09-12 LAB
ALBUMIN SERPL BCP-MCNC: 2.8 G/DL (ref 3.4–5)
ALP SERPL-CCNC: 72 U/L (ref 46–116)
ALT SERPL W P-5'-P-CCNC: 63 U/L (ref 12–78)
AST SERPL W P-5'-P-CCNC: 58 U/L (ref 15–37)
BASOPHILS # BLD AUTO: 0 /CMM (ref 0–0.2)
BASOPHILS NFR BLD AUTO: 0.5 % (ref 0–2)
BILIRUB SERPL-MCNC: 0.5 MG/DL (ref 0.2–1)
BUN SERPL-MCNC: 7 MG/DL (ref 7–18)
CALCIUM SERPL-MCNC: 8.7 MG/DL (ref 8.5–10.1)
CHLORIDE SERPL-SCNC: 105 MMOL/L (ref 98–107)
CO2 SERPL-SCNC: 30 MMOL/L (ref 21–32)
CREAT SERPL-MCNC: 0.7 MG/DL (ref 0.6–1.3)
EOSINOPHIL NFR BLD AUTO: 2 % (ref 0–6)
GLUCOSE SERPL-MCNC: 99 MG/DL (ref 74–106)
HCT VFR BLD AUTO: 39 % (ref 39–51)
HGB BLD-MCNC: 13 G/DL (ref 13.5–17.5)
LYMPHOCYTES NFR BLD AUTO: 1.5 /CMM (ref 0.8–4.8)
LYMPHOCYTES NFR BLD AUTO: 24.7 % (ref 20–44)
MAGNESIUM SERPL-MCNC: 1.9 MG/DL (ref 1.8–2.4)
MCHC RBC AUTO-ENTMCNC: 34 G/DL (ref 31–36)
MCV RBC AUTO: 89 FL (ref 80–96)
MONOCYTES NFR BLD AUTO: 0.8 /CMM (ref 0.1–1.3)
MONOCYTES NFR BLD AUTO: 12.1 % (ref 2–12)
NEUTROPHILS # BLD AUTO: 3.8 /CMM (ref 1.8–8.9)
NEUTROPHILS NFR BLD AUTO: 60.7 % (ref 43–81)
PLATELET # BLD AUTO: 225 /CMM (ref 150–450)
POTASSIUM SERPL-SCNC: 3.2 MMOL/L (ref 3.5–5.1)
PROT SERPL-MCNC: 6.5 G/DL (ref 6.4–8.2)
RBC # BLD AUTO: 4.38 MIL/UL (ref 4.5–6)
SODIUM SERPL-SCNC: 142 MMOL/L (ref 136–145)
WBC NRBC COR # BLD AUTO: 6.3 K/UL (ref 4.3–11)

## 2019-09-12 RX ADMIN — FOLIC ACID SCH MG: 1 TABLET ORAL at 11:15

## 2019-09-12 RX ADMIN — Medication SCH OZ: at 09:21

## 2019-09-12 RX ADMIN — THERA TABS SCH UDTAB: TAB at 11:15

## 2019-09-12 RX ADMIN — TEMAZEPAM PRN MG: 7.5 CAPSULE ORAL at 23:12

## 2019-09-12 RX ADMIN — BENZTROPINE MESYLATE SCH MG: 1 TABLET ORAL at 16:23

## 2019-09-12 RX ADMIN — LEVOFLOXACIN SCH MG: 500 TABLET, FILM COATED ORAL at 12:02

## 2019-09-12 RX ADMIN — ALBUTEROL SULFATE SCH MG: 2.5 SOLUTION RESPIRATORY (INHALATION) at 07:35

## 2019-09-12 RX ADMIN — OXYCODONE HYDROCHLORIDE AND ACETAMINOPHEN SCH MG: 500 TABLET ORAL at 16:23

## 2019-09-12 RX ADMIN — BENZTROPINE MESYLATE SCH MG: 1 TABLET ORAL at 11:15

## 2019-09-12 RX ADMIN — Medication SCH APPLIC: at 09:21

## 2019-09-12 RX ADMIN — SODIUM HYPOCHLORITE SCH ML: 1.25 SOLUTION TOPICAL at 09:20

## 2019-09-12 RX ADMIN — RISPERIDONE SCH MG: 0.5 TABLET, ORALLY DISINTEGRATING ORAL at 12:54

## 2019-09-12 RX ADMIN — AMLODIPINE BESYLATE SCH MG: 10 TABLET ORAL at 11:15

## 2019-09-12 RX ADMIN — ALBUTEROL SULFATE SCH MG: 2.5 SOLUTION RESPIRATORY (INHALATION) at 13:30

## 2019-09-12 RX ADMIN — FAMOTIDINE SCH MG: 20 TABLET, FILM COATED ORAL at 11:14

## 2019-09-12 RX ADMIN — Medication SCH MG: at 11:15

## 2019-09-12 RX ADMIN — ALBUTEROL SULFATE SCH MG: 2.5 SOLUTION RESPIRATORY (INHALATION) at 19:30

## 2019-09-12 RX ADMIN — MUPIROCIN SCH APPLIC: 20 OINTMENT TOPICAL at 21:00

## 2019-09-12 RX ADMIN — ALBUTEROL SULFATE SCH MG: 2.5 SOLUTION RESPIRATORY (INHALATION) at 01:09

## 2019-09-12 RX ADMIN — ATORVASTATIN CALCIUM SCH MG: 10 TABLET, FILM COATED ORAL at 22:00

## 2019-09-12 RX ADMIN — FAMOTIDINE SCH MG: 20 TABLET, FILM COATED ORAL at 16:23

## 2019-09-12 RX ADMIN — MUPIROCIN SCH APPLIC: 20 OINTMENT TOPICAL at 09:21

## 2019-09-12 RX ADMIN — RISPERIDONE SCH MG: 0.5 TABLET, ORALLY DISINTEGRATING ORAL at 16:23

## 2019-09-12 RX ADMIN — RISPERIDONE SCH MG: 0.5 TABLET, ORALLY DISINTEGRATING ORAL at 11:14

## 2019-09-12 RX ADMIN — OXYCODONE HYDROCHLORIDE AND ACETAMINOPHEN SCH MG: 500 TABLET ORAL at 11:15
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
## 2019-09-12 NOTE — NUR
RN GPS NOTES

PT IN BED, ASLEEP, EASY TO AROUSE, CALM AT THIS TIME, SITTER AT BEDSIDE, CALL LIGHT WITHIN 
REACH, KEPT WARM AND COMFORTABLE IN BED.

## 2019-09-12 NOTE — NUR
MS/RN

PATIENT WAS ABLE TO SLEEP FOR ABOUT 45 MINUTES AFTER THE ZYPREXA, THE PATIENT PATIENT WAS 
BROUGHT DOWN FOR CAT OF THE THE HEAD, AND CAME BACK AWAKE BUT CALM. PATIENT IS AWAKE AT THIS 
TIME AND IS STARTING TO AGITATE, ATTEMPTED TO GIVE RESTORIL AS ORDERED BUT REFUSED AT THIS 
TIME. WILL CONTINUE TO MONITOR PATIENT.

## 2019-09-12 NOTE — NUR
RN GPS NOTES

PT IN BED, AWAKE, ALERT TO SELF, WITH CONFUSION, CALM AND REDIRECTABLE, COMPLIANT WITH MEDS, 
ASSISTED WITH DINNER, ASSISTED TO BATHROOM AS NEEDED, WOUND TREATMENT AND DRESSING CHANGE 
DONE, SAFETY PRECAUTIONS OBSERVED, SITTER AT BEDSIDE, ALL NEEDS ATTENDED.

## 2019-09-12 NOTE — NUR
GPS OVERFLOW RN NOTE:



PATIENT RESTING IN BED, NO ACUTE DISTRESS NOTED. BREATHING EVEN AND UNLABORED, NO SOB NOTED. 
SITTER AT BEDSIDE. ISOLATION PRECAUTION OBSERVED. BED LOCKED AND IN LOWEST POSITION, CALL 
LIGHT IN REACH. WILL ENDORSE TO DAY NURSE TO CONTINUE WITH PLAN OF CARE.

## 2019-09-12 NOTE — NUR
RN GPS NOTES

PT IN BED, SLEEPY, VERBALLY RESPONSIVE, WITH CONFUSION, REDIRECTED AS NEEDED, SITTER AT 
BEDSIDE, SEEN BY DR. BERTRAND, PT ALSO SEEN BY DR. GOINS, ORDERS GIVEN AND CARRIED OUT, 
SAFETY PRECAUTIONS OBSERVED.

## 2019-09-13 VITALS — SYSTOLIC BLOOD PRESSURE: 117 MMHG | DIASTOLIC BLOOD PRESSURE: 62 MMHG

## 2019-09-13 VITALS — SYSTOLIC BLOOD PRESSURE: 157 MMHG | DIASTOLIC BLOOD PRESSURE: 78 MMHG

## 2019-09-13 VITALS — DIASTOLIC BLOOD PRESSURE: 62 MMHG | SYSTOLIC BLOOD PRESSURE: 117 MMHG

## 2019-09-13 VITALS — DIASTOLIC BLOOD PRESSURE: 78 MMHG | SYSTOLIC BLOOD PRESSURE: 125 MMHG

## 2019-09-13 RX ADMIN — OXYCODONE HYDROCHLORIDE AND ACETAMINOPHEN SCH MG: 500 TABLET ORAL at 08:18

## 2019-09-13 RX ADMIN — THERA TABS SCH UDTAB: TAB at 08:18

## 2019-09-13 RX ADMIN — FAMOTIDINE SCH MG: 20 TABLET, FILM COATED ORAL at 16:10

## 2019-09-13 RX ADMIN — ASPIRIN 81 MG SCH MG: 81 TABLET ORAL at 09:00

## 2019-09-13 RX ADMIN — AMLODIPINE BESYLATE SCH MG: 10 TABLET ORAL at 08:18

## 2019-09-13 RX ADMIN — ATORVASTATIN CALCIUM SCH MG: 10 TABLET, FILM COATED ORAL at 22:04

## 2019-09-13 RX ADMIN — Medication SCH OZ: at 09:00

## 2019-09-13 RX ADMIN — ALBUTEROL SULFATE SCH MG: 2.5 SOLUTION RESPIRATORY (INHALATION) at 19:23

## 2019-09-13 RX ADMIN — ALBUTEROL SULFATE SCH MG: 2.5 SOLUTION RESPIRATORY (INHALATION) at 01:16

## 2019-09-13 RX ADMIN — MUPIROCIN SCH APPLIC: 20 OINTMENT TOPICAL at 09:00

## 2019-09-13 RX ADMIN — RISPERIDONE PRN MG: 0.5 TABLET, ORALLY DISINTEGRATING ORAL at 20:55

## 2019-09-13 RX ADMIN — FAMOTIDINE SCH MG: 20 TABLET, FILM COATED ORAL at 08:18

## 2019-09-13 RX ADMIN — SODIUM HYPOCHLORITE SCH ML: 1.25 SOLUTION TOPICAL at 09:00

## 2019-09-13 RX ADMIN — MUPIROCIN SCH APPLIC: 20 OINTMENT TOPICAL at 21:00

## 2019-09-13 RX ADMIN — BENZTROPINE MESYLATE SCH MG: 1 TABLET ORAL at 16:10

## 2019-09-13 RX ADMIN — ALBUTEROL SULFATE SCH MG: 2.5 SOLUTION RESPIRATORY (INHALATION) at 13:30

## 2019-09-13 RX ADMIN — RISPERIDONE SCH MG: 0.5 TABLET, ORALLY DISINTEGRATING ORAL at 19:56

## 2019-09-13 RX ADMIN — RISPERIDONE SCH MG: 0.5 TABLET, ORALLY DISINTEGRATING ORAL at 08:17

## 2019-09-13 RX ADMIN — LEVOFLOXACIN SCH MLS/HR: 500 INJECTION, SOLUTION INTRAVENOUS at 15:12

## 2019-09-13 RX ADMIN — FOLIC ACID SCH MG: 1 TABLET ORAL at 08:18

## 2019-09-13 RX ADMIN — OXYCODONE HYDROCHLORIDE AND ACETAMINOPHEN SCH MG: 500 TABLET ORAL at 16:25

## 2019-09-13 RX ADMIN — Medication SCH MG: at 08:18

## 2019-09-13 RX ADMIN — BENZTROPINE MESYLATE SCH MG: 1 TABLET ORAL at 19:56

## 2019-09-13 RX ADMIN — Medication SCH APPLIC: at 09:00

## 2019-09-13 RX ADMIN — LEVOFLOXACIN SCH MG: 500 TABLET, FILM COATED ORAL at 12:00

## 2019-09-13 RX ADMIN — BENZTROPINE MESYLATE SCH MG: 1 TABLET ORAL at 08:18

## 2019-09-13 RX ADMIN — ALBUTEROL SULFATE SCH MG: 2.5 SOLUTION RESPIRATORY (INHALATION) at 07:35

## 2019-09-13 NOTE — NUR
PER   LEVAQUIN PO CHANGED TO LEVAQUIN  MG Q24H. 

IV LINE INSERTED  TO LEFT ARM G 22 H/L , FLUSHING WELL

## 2019-09-13 NOTE — NUR
MS/RN

SPOKE TO DR. BERTRAND, GAVE HER UPDATE ABOUT PATIENT'S CONDITION, INFORMED DR. BERTRAND THAT 
PATIENT IS VERY RESTLESS AND AGITATED AT THIS TIME. PER DR. BERTRAND SHE WILL ENTER 
MEDICATION ORDERS AND REQUESTED TO TALK TO THE PHARMACIST. CALL WAS TRANSFERRED TO THE 
PHARMACY.

## 2019-09-13 NOTE — NUR
MS/RN

PATIENT REFUSED THE RESTORIL THAT WAS OFFERED EARLIER. UNABLE TO RETURN THE RESTORIL AS IT 
WAS MIXED WITH APPLE SAUCE.

## 2019-09-13 NOTE — NUR
MS/RN

PATIENT IS SLEEPING AT THIS TIME, APPEAR COMFORTABLE, NO DISTRESS NOTED, SITTER AT BEDSIDE. 
WILL CONTINUE TO MONITOR.

## 2019-09-13 NOTE — NUR
MS/RN

RECEIVED A CALL FROM DR. BERTRAND, GAVE HER UPDATE OF THE PATIENT'S CONDITION AT THIS TIME, 
THAT PATIENT IS STILL AWAKE, STILL RESTLESS BUT BETTER THAN BEFORE THE MEDS WERE GIVEN. DR. BERTRAND INSTRUCTED ME TO GIVE ANOTHER  DOSE OF RISPERDAL 1 MG NOW.

## 2019-09-13 NOTE — NUR
MS/RN

PATIENT STILL VERY AGITATED AND RESTLESS, TRYING TO GET OUT OF BED. CALLED AND SPOKE TO DR. BERTRAND WITH ORDER TO GIVE ZYPREXA 10 MG IM X 1 WAS RECEIVED.

## 2019-09-13 NOTE — NUR
patient calm at this time, resting in bed , wife at bedside. IV Levaquin running as ordered. 
Unable to change dressing : patient refused x2. Safety precautions observed and implemented, 
patient high risk of fall. Bed in low and locked position, side rails up x3. Sitter at 
bedside. Will endorse to next shift for ASHANTI.

## 2019-09-13 NOTE — NUR
MS/RN

PATIENT IS SLEEPING, NO DISTRESS NOTED, SITTER AT BEDSIDE, ALL NEEDS ATTENDED AT THIS TIME, 
WILL CONTINUE TO MONITOR.

## 2019-09-13 NOTE — NUR
GROUP NOTE: Pt unable to attend group due to pt being aggressive and refusing medications. 
Pt is in the medical/surgical floor and is on a 1:1. Pt is only alert and oriented to 
himself otherwise, pt is confused, disoriented, and disorganized.

## 2019-09-13 NOTE — NUR
MS/RN

RISPERDAL 1 MG PO, COGENTIN 1 MG PO AND KLONOPIN 0.5 MG PO WERE SUCCESSFULLY ADMINISTERED AS 
PER ORDER BY DR. BERTRAND.  MERLINE VÁSQUEZ TO MONITOR PATIENT.

## 2019-09-14 VITALS — DIASTOLIC BLOOD PRESSURE: 76 MMHG | SYSTOLIC BLOOD PRESSURE: 123 MMHG

## 2019-09-14 VITALS — DIASTOLIC BLOOD PRESSURE: 91 MMHG | SYSTOLIC BLOOD PRESSURE: 135 MMHG

## 2019-09-14 VITALS — SYSTOLIC BLOOD PRESSURE: 131 MMHG | DIASTOLIC BLOOD PRESSURE: 74 MMHG

## 2019-09-14 RX ADMIN — FAMOTIDINE SCH MG: 20 TABLET, FILM COATED ORAL at 16:30

## 2019-09-14 RX ADMIN — SODIUM HYPOCHLORITE SCH ML: 1.25 SOLUTION TOPICAL at 09:00

## 2019-09-14 RX ADMIN — ATORVASTATIN CALCIUM SCH MG: 10 TABLET, FILM COATED ORAL at 20:40

## 2019-09-14 RX ADMIN — ASPIRIN 81 MG SCH MG: 81 TABLET ORAL at 08:36

## 2019-09-14 RX ADMIN — MUPIROCIN SCH APPLIC: 20 OINTMENT TOPICAL at 21:00

## 2019-09-14 RX ADMIN — RISPERIDONE SCH MG: 0.5 TABLET, ORALLY DISINTEGRATING ORAL at 20:40

## 2019-09-14 RX ADMIN — OXYCODONE HYDROCHLORIDE AND ACETAMINOPHEN SCH MG: 500 TABLET ORAL at 08:35

## 2019-09-14 RX ADMIN — TEMAZEPAM PRN MG: 7.5 CAPSULE ORAL at 23:23

## 2019-09-14 RX ADMIN — Medication SCH MG: at 08:38

## 2019-09-14 RX ADMIN — LEVOFLOXACIN SCH MLS/HR: 500 INJECTION, SOLUTION INTRAVENOUS at 13:33

## 2019-09-14 RX ADMIN — BENZTROPINE MESYLATE SCH MG: 1 TABLET ORAL at 20:39

## 2019-09-14 RX ADMIN — ALBUTEROL SULFATE SCH MG: 2.5 SOLUTION RESPIRATORY (INHALATION) at 13:22

## 2019-09-14 RX ADMIN — ALBUTEROL SULFATE SCH MG: 2.5 SOLUTION RESPIRATORY (INHALATION) at 01:30

## 2019-09-14 RX ADMIN — ALBUTEROL SULFATE SCH MG: 2.5 SOLUTION RESPIRATORY (INHALATION) at 19:30

## 2019-09-14 RX ADMIN — OXYCODONE HYDROCHLORIDE AND ACETAMINOPHEN SCH MG: 500 TABLET ORAL at 16:30

## 2019-09-14 RX ADMIN — Medication SCH APPLIC: at 09:00

## 2019-09-14 RX ADMIN — Medication SCH OZ: at 09:00

## 2019-09-14 RX ADMIN — MUPIROCIN SCH APPLIC: 20 OINTMENT TOPICAL at 09:00

## 2019-09-14 RX ADMIN — RISPERIDONE PRN MG: 0.5 TABLET, ORALLY DISINTEGRATING ORAL at 22:08

## 2019-09-14 RX ADMIN — AMLODIPINE BESYLATE SCH MG: 10 TABLET ORAL at 08:37

## 2019-09-14 RX ADMIN — VALPROIC ACID SCH MG: 250 SOLUTION ORAL at 10:28

## 2019-09-14 RX ADMIN — FOLIC ACID SCH MG: 1 TABLET ORAL at 08:36

## 2019-09-14 RX ADMIN — RISPERIDONE SCH MG: 0.5 TABLET, ORALLY DISINTEGRATING ORAL at 13:04

## 2019-09-14 RX ADMIN — THERA TABS SCH UDTAB: TAB at 08:36

## 2019-09-14 RX ADMIN — ALBUTEROL SULFATE SCH MG: 2.5 SOLUTION RESPIRATORY (INHALATION) at 07:35

## 2019-09-14 RX ADMIN — BENZTROPINE MESYLATE SCH MG: 1 TABLET ORAL at 08:36

## 2019-09-14 RX ADMIN — FAMOTIDINE SCH MG: 20 TABLET, FILM COATED ORAL at 08:36

## 2019-09-14 RX ADMIN — BENZTROPINE MESYLATE SCH MG: 1 TABLET ORAL at 13:03

## 2019-09-14 RX ADMIN — VALPROIC ACID SCH MG: 250 SOLUTION ORAL at 16:30

## 2019-09-14 RX ADMIN — RISPERIDONE PRN MG: 0.5 TABLET, ORALLY DISINTEGRATING ORAL at 07:13

## 2019-09-14 RX ADMIN — VALPROIC ACID SCH MG: 250 SOLUTION ORAL at 13:04

## 2019-09-14 NOTE — NUR
PRN RESTORIL:

ACCORDING TO PT'S WIFE PT HASNT BEEN SLEEPING FOR COUPLE OF DAYS NOW, REMAINS TO BE 
RESTLESS. PER CLINICAL ASSESSMENT, PRN RESTORIL ADMINISTERED AT THIS TIME.

## 2019-09-14 NOTE — NUR
PT REFUSED TO BE TOUCH AND HAVE HIS NOSE TOUCH. WIFE AT BED SIDE, PT STARTED BEING 
IRRITATED, RESTLESS, DR CATALAN CURRENTLY IN THE UNIT, PT'S WIFE REFUSING FOR ANY 
RESTRAINTS. SITTER AT BED SIDE.

## 2019-09-14 NOTE — NUR
patient calm and resting this time in bed, wife at bedside. IV line remain intact and 
patent. Was able to change dressing. Patient kept clean and dry,hydrogel applied.  Safety 
precautions observed and implemented, patient high risk of fall. Bed in low and locked 
position, side rails up x3. Sitter at bedside. Will endorse to next shift for ASHANTI.

## 2019-09-14 NOTE — NUR
rn notes:

pt pooped all over the floor. cleaned pt. complete linen changed provided. psych md saw how 
pt behave. pt really have difficult to control behavior.

## 2019-09-14 NOTE — NUR
MS/RN

SPOKE TO DR. BERTRAND VIA PHONE, GAVE UPDATE ABOUT THE PATIENT WHO IS STARTING TO GET 
RESTLESS AND AGITATED. DR. BERTRAND INSTRUCTED ME TO GIVE PATIENT RISPERDAL 1 MG AND KLONOPIN 
0.5 MG FROM PRN ORDERS. MEDS GIVEN. WILL ENDORSE TO NEXT RN.

## 2019-09-14 NOTE — NUR
rn notes:

pt remains to be restless, agitated, wanting to leave his room, frequent redirection 
provided, but not effective. pt very confused. high risk for fall. explained to pt wife 
importance of safety. wife refusing any type of restraint.

## 2019-09-14 NOTE — NUR
rn initial notes:

pt in bed, met with wife at bed side. pt very confused, disoriented, demented, unable to 
perform any adls, episode of being aggressive and combative, restless, agitated, needs 
multiple redirection but not effective. seen by dr barry at 2100. pt been having loose 
stools, cdiff specimen sent, awaiting result, pt on iv atb during the day. pt able to take 
meds, crushed with apple sauce. unable to determine for any si/hi due to pt's mental status. 
wife very involved in pt's care. safety precautions for fall initiated, side rails up x3 
aminah safety, on 1:1 sitter. will continue monitoring pt [id number] for safety and any changes 
in behavior.

## 2019-09-14 NOTE — NUR
RN NOTES:

PT REMAINS TO BE AGITATED, REMOVING HIS IV ACCESS, DOES NOT LISTEN TO ANY DIRECTION. WIFE AT 
BED SIDE, JUST OBSERVING WHAT IS HAPPENING. REITERATE TO THE WIFE THAT PT IS HIGH RISK FOR 
FALL, AND PT KEPT TRYING TO GET OUT OF BED, VERY UNSTEADY, VERY DIFFICULT TO CONTROL 
BEHAVIOR.

## 2019-09-14 NOTE — NUR
MS/RN

PATIENT IS AWAKE AT THIS TIME, MILD RESTLESSNESS NOTED, NO DISTRESS NOTED, PATIENT WAS ABLE 
TO SLEEP FOR APPROXIMATELY 3 HOURS. ALL NEEDS ATTENDED AT THIS TIME, WILL CONTINUE TO 
MONITOR.

## 2019-09-14 NOTE — NUR
PRN RISPERDAL:

RECEIVED CALL FROM DR BERTRAND, PSYCH MD, INFORMED ABOUT PT'S BEHAVIOR. PER MD TO GIVE THE 
PRN RISPERDAL NOW.

## 2019-09-15 VITALS — SYSTOLIC BLOOD PRESSURE: 110 MMHG | DIASTOLIC BLOOD PRESSURE: 68 MMHG

## 2019-09-15 VITALS — DIASTOLIC BLOOD PRESSURE: 73 MMHG | SYSTOLIC BLOOD PRESSURE: 108 MMHG

## 2019-09-15 VITALS — DIASTOLIC BLOOD PRESSURE: 80 MMHG | SYSTOLIC BLOOD PRESSURE: 121 MMHG

## 2019-09-15 VITALS — SYSTOLIC BLOOD PRESSURE: 108 MMHG | DIASTOLIC BLOOD PRESSURE: 73 MMHG

## 2019-09-15 VITALS — SYSTOLIC BLOOD PRESSURE: 111 MMHG | DIASTOLIC BLOOD PRESSURE: 68 MMHG

## 2019-09-15 LAB
ALBUMIN SERPL BCP-MCNC: 3.1 G/DL (ref 3.4–5)
ALP SERPL-CCNC: 92 U/L (ref 46–116)
ALT SERPL W P-5'-P-CCNC: 50 U/L (ref 12–78)
AST SERPL W P-5'-P-CCNC: 44 U/L (ref 15–37)
BASOPHILS # BLD AUTO: 0.1 /CMM (ref 0–0.2)
BASOPHILS NFR BLD AUTO: 0.5 % (ref 0–2)
BILIRUB SERPL-MCNC: 0.7 MG/DL (ref 0.2–1)
BUN SERPL-MCNC: 19 MG/DL (ref 7–18)
CALCIUM SERPL-MCNC: 8.9 MG/DL (ref 8.5–10.1)
CHLORIDE SERPL-SCNC: 104 MMOL/L (ref 98–107)
CO2 SERPL-SCNC: 26 MMOL/L (ref 21–32)
CREAT SERPL-MCNC: 1 MG/DL (ref 0.6–1.3)
EOSINOPHIL NFR BLD AUTO: 0.7 % (ref 0–6)
GLUCOSE SERPL-MCNC: 137 MG/DL (ref 74–106)
HCT VFR BLD AUTO: 41 % (ref 39–51)
HGB BLD-MCNC: 13.5 G/DL (ref 13.5–17.5)
LYMPHOCYTES NFR BLD AUTO: 1.5 /CMM (ref 0.8–4.8)
LYMPHOCYTES NFR BLD AUTO: 10.6 % (ref 20–44)
MCHC RBC AUTO-ENTMCNC: 33 G/DL (ref 31–36)
MCV RBC AUTO: 89 FL (ref 80–96)
MONOCYTES NFR BLD AUTO: 1.4 /CMM (ref 0.1–1.3)
MONOCYTES NFR BLD AUTO: 9.7 % (ref 2–12)
NEUTROPHILS # BLD AUTO: 11.2 /CMM (ref 1.8–8.9)
NEUTROPHILS NFR BLD AUTO: 78.5 % (ref 43–81)
PLATELET # BLD AUTO: 172 /CMM (ref 150–450)
POTASSIUM SERPL-SCNC: 3.7 MMOL/L (ref 3.5–5.1)
PROT SERPL-MCNC: 7.5 G/DL (ref 6.4–8.2)
RBC # BLD AUTO: 4.55 MIL/UL (ref 4.5–6)
SODIUM SERPL-SCNC: 141 MMOL/L (ref 136–145)
WBC NRBC COR # BLD AUTO: 14.3 K/UL (ref 4.3–11)

## 2019-09-15 RX ADMIN — OXYCODONE HYDROCHLORIDE AND ACETAMINOPHEN SCH MG: 500 TABLET ORAL at 08:34

## 2019-09-15 RX ADMIN — FOLIC ACID SCH MG: 1 TABLET ORAL at 08:33

## 2019-09-15 RX ADMIN — FAMOTIDINE SCH MG: 20 TABLET, FILM COATED ORAL at 08:33

## 2019-09-15 RX ADMIN — Medication SCH APPLIC: at 09:00

## 2019-09-15 RX ADMIN — BENZTROPINE MESYLATE SCH MG: 1 TABLET ORAL at 20:39

## 2019-09-15 RX ADMIN — ASPIRIN 81 MG SCH MG: 81 TABLET ORAL at 08:37

## 2019-09-15 RX ADMIN — BENZTROPINE MESYLATE SCH MG: 1 TABLET ORAL at 12:38

## 2019-09-15 RX ADMIN — ALBUTEROL SULFATE SCH MG: 2.5 SOLUTION RESPIRATORY (INHALATION) at 07:15

## 2019-09-15 RX ADMIN — SODIUM HYPOCHLORITE SCH ML: 1.25 SOLUTION TOPICAL at 09:00

## 2019-09-15 RX ADMIN — Medication SCH MG: at 08:34

## 2019-09-15 RX ADMIN — VALPROIC ACID SCH MG: 250 SOLUTION ORAL at 08:33

## 2019-09-15 RX ADMIN — Medication SCH OZ: at 09:00

## 2019-09-15 RX ADMIN — ALBUTEROL SULFATE SCH MG: 2.5 SOLUTION RESPIRATORY (INHALATION) at 01:30

## 2019-09-15 RX ADMIN — OXYCODONE HYDROCHLORIDE AND ACETAMINOPHEN SCH MG: 500 TABLET ORAL at 16:49

## 2019-09-15 RX ADMIN — RISPERIDONE SCH MG: 0.5 TABLET, ORALLY DISINTEGRATING ORAL at 12:38

## 2019-09-15 RX ADMIN — RISPERIDONE PRN MG: 0.5 TABLET, ORALLY DISINTEGRATING ORAL at 23:49

## 2019-09-15 RX ADMIN — VALPROIC ACID SCH MG: 250 SOLUTION ORAL at 16:46

## 2019-09-15 RX ADMIN — LEVOFLOXACIN SCH MG: 500 TABLET, FILM COATED ORAL at 08:33

## 2019-09-15 RX ADMIN — ALBUTEROL SULFATE SCH MG: 2.5 SOLUTION RESPIRATORY (INHALATION) at 13:30

## 2019-09-15 RX ADMIN — ACETAMINOPHEN PRN MG: 325 TABLET ORAL at 21:52

## 2019-09-15 RX ADMIN — AMLODIPINE BESYLATE SCH MG: 10 TABLET ORAL at 08:37

## 2019-09-15 RX ADMIN — FAMOTIDINE SCH MG: 20 TABLET, FILM COATED ORAL at 16:49

## 2019-09-15 RX ADMIN — RISPERIDONE SCH MG: 0.5 TABLET, ORALLY DISINTEGRATING ORAL at 20:39

## 2019-09-15 RX ADMIN — ATORVASTATIN CALCIUM SCH MG: 10 TABLET, FILM COATED ORAL at 21:47

## 2019-09-15 RX ADMIN — THERA TABS SCH UDTAB: TAB at 08:34

## 2019-09-15 RX ADMIN — MUPIROCIN SCH APPLIC: 20 OINTMENT TOPICAL at 22:13

## 2019-09-15 RX ADMIN — VALPROIC ACID SCH MG: 250 SOLUTION ORAL at 12:38

## 2019-09-15 RX ADMIN — ALBUTEROL SULFATE SCH MG: 2.5 SOLUTION RESPIRATORY (INHALATION) at 19:30

## 2019-09-15 RX ADMIN — MUPIROCIN SCH APPLIC: 20 OINTMENT TOPICAL at 08:38

## 2019-09-15 RX ADMIN — BENZTROPINE MESYLATE SCH MG: 1 TABLET ORAL at 08:34

## 2019-09-15 NOTE — NUR
RN NOTES:

PT STARTED BECOMING AGGRESSIVE, TRYING TO PULL HIMSELF OUT OF BED, VERY AGITATED AND 
RESTLESS, PAGED PSYCH MD ON CALL [phone], AWAITING CALL BACK.

## 2019-09-15 NOTE — NUR
prn risperdal:

pt still restless, agitated,uncooperative trying to get out of bed multiple times, tried 
pulling out iv access, started becoming combative, saying profanity. prn risperdal 
administered at this time.

## 2019-09-15 NOTE — NUR
PRN TYLENOL:

ACCDG TO PT'S WIFE PT C/O HEAD ACHE REQUESTING FOR TYLENOL:PRN TYLENOL ADMINISTERED AT THIS 
TIME

## 2019-09-15 NOTE — NUR
RN NOTES:

ASKED [employer] CHARGE RN REGARDING NUMBER TO CALL, AS PSYCH MD ON CALL DR CATALAN HASN'T CALL 
BACK, DESPITE BEING PAGED 3X. PER RN CHARGE , TO CALL PSYCH DIRECTOR DR REDDING [phone]. 
PAGED. RECEIVED CALL BACK AND INFORMED ABOUT PT'S BEHAVIOR, INFORMED ABOUT ALLERGY OF PT. 
PER MD T/O OF ZYPREXA 10MG IM X1 DOSE NOW. ORDER VERIFIED AND CARRIED OUT.

## 2019-09-15 NOTE — NUR
RN NOTES:

PT NOTED TO BE TRYING TO GET OUT OF BED, PULLED OUT IV, UNABLE TO MANAGE BEHAVIOR, PAGED 
PSYCH MD ON CALL [phone]

## 2019-09-15 NOTE — NUR
rn initial notes:

received report from ivy ortiz. pt remains to be disorganized, confused, agitated at times, 
rambling words, wife at bed side. no iv access per gps policy. unsteady gait. side rails up 
x3 for safety, sitter at bed side. will continue to monitor pt [id number] for safety and any 
changes in behavior.

## 2019-09-15 NOTE — NUR
RN NOTES:

PT REMOVED HIS IV ACCESS, UNABLE TO RESTART NEW LINE AS PT BEEN RESTLESS, UNCOOPERATIVE, 
AGITATED, ANXIOUS.

## 2019-09-15 NOTE — NUR
RN NOTES:

SPOKED TO RN CHARGE OF [employer], INFORMED ABOUT PT'S BEHAVIOR, PER [employer] CHARGE PSYCH MD ON CALL IS 
DR CATALAN, GIVEN THE NUMBER [phone]. WILL NOTIFY MD

## 2019-09-15 NOTE — NUR
RN NOTES:

PT REFUSING TREATMENT. EASILY AGITATED, VERY CONFUSED, AGGRESSIVE, WHENEVER WE START 
CLEANING HIS WOUND HE WILL TOUCH THE AREA AGAIN WITH HIS BARE HANDS, OR REMOVE DRESSING 
AFTERWARDS, REMOVED HIS DIAPER, AND IT WAS A CYCLE, UNTIL HE WILL START BECOMING 
UNPREDICTABLE AND COMBATIVE. IT IS UNSAFE FOR THE STAFF TO PROCEED.

## 2019-09-15 NOTE — NUR
BACTROBAN ADMIN:

UNABLE TO SCAN BARCODE, AS IT WAS RIPPED. ABLE TO APPLY OINTMENT ON PT'S NOSTRILS, WITNESSED 
BY SITTER AND WIFE AT BED SIDE

## 2019-09-15 NOTE — NUR
RN CLOSING NOTES:

PT IN BED, REMAINS CONFUSED, DISORIENTED, RESTLESS, TRYING TO REMOVE IV ACCESS. AWAKE ENTIRE 
NIGHT. . VS REMAINS STABLE, NEEDS ATTENDED. SAFETY PRECAUTIONS FOR FALL REMAINS ENGAGED, 
CALL LIGHT IN REACH, WILL ENDORSE TO DAY RN FOR CONTINUITY OF CARE.

## 2019-09-16 ENCOUNTER — HOSPITAL ENCOUNTER (INPATIENT)
Dept: HOSPITAL 54 - MEDSG2 | Age: 69
LOS: 7 days | Discharge: NURSING, REHAB OR LONG-TERM CARE | DRG: 356 | End: 2019-09-23
Attending: NURSE PRACTITIONER | Admitting: NURSE PRACTITIONER
Payer: MEDICARE

## 2019-09-16 VITALS — BODY MASS INDEX: 23.05 KG/M2 | HEIGHT: 70 IN | WEIGHT: 161 LBS

## 2019-09-16 VITALS — DIASTOLIC BLOOD PRESSURE: 70 MMHG | SYSTOLIC BLOOD PRESSURE: 144 MMHG

## 2019-09-16 VITALS — DIASTOLIC BLOOD PRESSURE: 56 MMHG | SYSTOLIC BLOOD PRESSURE: 116 MMHG

## 2019-09-16 VITALS — DIASTOLIC BLOOD PRESSURE: 53 MMHG | SYSTOLIC BLOOD PRESSURE: 116 MMHG

## 2019-09-16 VITALS — DIASTOLIC BLOOD PRESSURE: 73 MMHG | SYSTOLIC BLOOD PRESSURE: 125 MMHG

## 2019-09-16 DIAGNOSIS — G92: ICD-10-CM

## 2019-09-16 DIAGNOSIS — F32.9: ICD-10-CM

## 2019-09-16 DIAGNOSIS — B95.61: ICD-10-CM

## 2019-09-16 DIAGNOSIS — G47.00: ICD-10-CM

## 2019-09-16 DIAGNOSIS — L89.893: ICD-10-CM
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
## 2019-09-16 DIAGNOSIS — Z86.73: ICD-10-CM

## 2019-09-16 DIAGNOSIS — Z82.49: ICD-10-CM

## 2019-09-16 DIAGNOSIS — Z87.891: ICD-10-CM

## 2019-09-16 DIAGNOSIS — E86.0: ICD-10-CM

## 2019-09-16 DIAGNOSIS — N39.0: ICD-10-CM

## 2019-09-16 DIAGNOSIS — G40.909: ICD-10-CM

## 2019-09-16 DIAGNOSIS — S00.03XA: ICD-10-CM

## 2019-09-16 DIAGNOSIS — L89.623: ICD-10-CM

## 2019-09-16 DIAGNOSIS — E87.0: ICD-10-CM

## 2019-09-16 DIAGNOSIS — I10: ICD-10-CM

## 2019-09-16 DIAGNOSIS — Z86.19: ICD-10-CM

## 2019-09-16 DIAGNOSIS — E44.1: ICD-10-CM

## 2019-09-16 DIAGNOSIS — B96.1: ICD-10-CM

## 2019-09-16 DIAGNOSIS — K21.9: ICD-10-CM

## 2019-09-16 DIAGNOSIS — Z91.81: ICD-10-CM

## 2019-09-16 DIAGNOSIS — B95.5: ICD-10-CM

## 2019-09-16 DIAGNOSIS — G20: ICD-10-CM
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
## 2019-09-16 DIAGNOSIS — Z91.19: ICD-10-CM

## 2019-09-16 DIAGNOSIS — F10.10: ICD-10-CM

## 2019-09-16 DIAGNOSIS — A04.72: Primary | ICD-10-CM

## 2019-09-16 DIAGNOSIS — Z73.6: ICD-10-CM

## 2019-09-16 DIAGNOSIS — E87.6: ICD-10-CM

## 2019-09-16 DIAGNOSIS — F02.81: ICD-10-CM
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
## 2019-09-16 DIAGNOSIS — L89.153: ICD-10-CM

## 2019-09-16 LAB
ALBUMIN SERPL BCP-MCNC: 3.1 G/DL (ref 3.4–5)
ALP SERPL-CCNC: 107 U/L (ref 46–116)
ALT SERPL W P-5'-P-CCNC: 45 U/L (ref 12–78)
AST SERPL W P-5'-P-CCNC: 43 U/L (ref 15–37)
BASOPHILS # BLD AUTO: 0.1 /CMM (ref 0–0.2)
BASOPHILS NFR BLD AUTO: 0.5 % (ref 0–2)
BILIRUB SERPL-MCNC: 0.8 MG/DL (ref 0.2–1)
BUN SERPL-MCNC: 19 MG/DL (ref 7–18)
CALCIUM SERPL-MCNC: 9.2 MG/DL (ref 8.5–10.1)
CHLORIDE SERPL-SCNC: 105 MMOL/L (ref 98–107)
CO2 SERPL-SCNC: 28 MMOL/L (ref 21–32)
CREAT SERPL-MCNC: 1 MG/DL (ref 0.6–1.3)
EOSINOPHIL NFR BLD AUTO: 0.9 % (ref 0–6)
GLUCOSE SERPL-MCNC: 215 MG/DL (ref 74–106)
HCT VFR BLD AUTO: 44 % (ref 39–51)
HGB BLD-MCNC: 14.7 G/DL (ref 13.5–17.5)
LYMPHOCYTES NFR BLD AUTO: 1.8 /CMM (ref 0.8–4.8)
LYMPHOCYTES NFR BLD AUTO: 12.3 % (ref 20–44)
MCHC RBC AUTO-ENTMCNC: 33 G/DL (ref 31–36)
MCV RBC AUTO: 90 FL (ref 80–96)
MONOCYTES NFR BLD AUTO: 1.1 /CMM (ref 0.1–1.3)
MONOCYTES NFR BLD AUTO: 7.9 % (ref 2–12)
NEUTROPHILS # BLD AUTO: 11.2 /CMM (ref 1.8–8.9)
NEUTROPHILS NFR BLD AUTO: 78.4 % (ref 43–81)
PLATELET # BLD AUTO: 179 /CMM (ref 150–450)
POTASSIUM SERPL-SCNC: 4.6 MMOL/L (ref 3.5–5.1)
PROT SERPL-MCNC: 7.9 G/DL (ref 6.4–8.2)
RBC # BLD AUTO: 4.9 MIL/UL (ref 4.5–6)
SODIUM SERPL-SCNC: 143 MMOL/L (ref 136–145)
WBC NRBC COR # BLD AUTO: 14.2 K/UL (ref 4.3–11)

## 2019-09-16 PROCEDURE — A4349 DISPOSABLE MALE EXTERNAL CAT: HCPCS

## 2019-09-16 PROCEDURE — G0378 HOSPITAL OBSERVATION PER HR: HCPCS

## 2019-09-16 PROCEDURE — A6403 STERILE GAUZE>16 <= 48 SQ IN: HCPCS

## 2019-09-16 PROCEDURE — A6253 ABSORPT DRG > 48 SQ IN W/O B: HCPCS

## 2019-09-16 RX ADMIN — Medication SCH APPLIC: at 08:23

## 2019-09-16 RX ADMIN — ALBUTEROL SULFATE SCH MG: 2.5 SOLUTION RESPIRATORY (INHALATION) at 01:30

## 2019-09-16 RX ADMIN — BENZTROPINE MESYLATE SCH MG: 1 TABLET ORAL at 13:32

## 2019-09-16 RX ADMIN — Medication SCH EACH: at 17:43

## 2019-09-16 RX ADMIN — METRONIDAZOLE SCH MG: 500 TABLET ORAL at 17:43

## 2019-09-16 RX ADMIN — Medication SCH OZ: at 08:24

## 2019-09-16 RX ADMIN — FOLIC ACID SCH MG: 1 TABLET ORAL at 08:20

## 2019-09-16 RX ADMIN — Medication SCH MG: at 08:21

## 2019-09-16 RX ADMIN — ALBUTEROL SULFATE SCH MG: 2.5 SOLUTION RESPIRATORY (INHALATION) at 08:32

## 2019-09-16 RX ADMIN — FAMOTIDINE SCH MG: 20 TABLET, FILM COATED ORAL at 08:22

## 2019-09-16 RX ADMIN — RISPERIDONE SCH MG: 0.5 TABLET, ORALLY DISINTEGRATING ORAL at 11:03

## 2019-09-16 RX ADMIN — TEMAZEPAM PRN MG: 7.5 CAPSULE ORAL at 00:54

## 2019-09-16 RX ADMIN — VALPROIC ACID SCH MG: 250 SOLUTION ORAL at 08:20

## 2019-09-16 RX ADMIN — ASPIRIN 81 MG SCH MG: 81 TABLET ORAL at 08:21

## 2019-09-16 RX ADMIN — RISPERIDONE PRN MG: 0.5 TABLET, ORALLY DISINTEGRATING ORAL at 22:17

## 2019-09-16 RX ADMIN — LEVOFLOXACIN SCH MG: 500 TABLET, FILM COATED ORAL at 08:21

## 2019-09-16 RX ADMIN — RISPERIDONE SCH MG: 0.5 TABLET, ORALLY DISINTEGRATING ORAL at 13:32

## 2019-09-16 RX ADMIN — VANCOMYCIN HYDROCHLORIDE SCH MG: 125 CAPSULE ORAL at 18:18

## 2019-09-16 RX ADMIN — SODIUM HYPOCHLORITE SCH ML: 1.25 SOLUTION TOPICAL at 08:24

## 2019-09-16 RX ADMIN — MUPIROCIN SCH APPLIC: 20 OINTMENT TOPICAL at 08:24

## 2019-09-16 RX ADMIN — BENZTROPINE MESYLATE SCH MG: 1 TABLET ORAL at 08:20

## 2019-09-16 RX ADMIN — OXYCODONE HYDROCHLORIDE AND ACETAMINOPHEN SCH MG: 500 TABLET ORAL at 08:20

## 2019-09-16 RX ADMIN — FAMOTIDINE SCH MG: 20 TABLET, FILM COATED ORAL at 17:43

## 2019-09-16 RX ADMIN — AMLODIPINE BESYLATE SCH MG: 10 TABLET ORAL at 08:21

## 2019-09-16 RX ADMIN — VALPROIC ACID SCH MG: 250 SOLUTION ORAL at 22:17

## 2019-09-16 RX ADMIN — OXYCODONE HYDROCHLORIDE AND ACETAMINOPHEN SCH MG: 500 TABLET ORAL at 17:43

## 2019-09-16 RX ADMIN — VALPROIC ACID SCH MG: 250 SOLUTION ORAL at 13:32

## 2019-09-16 RX ADMIN — THERA TABS SCH UDTAB: TAB at 08:21

## 2019-09-16 NOTE — NUR
MS RN NOTES



RECEIVED CALL FROM DR. GOINS, HE WILL SPEAK TO DR. BERTRAND. AND WILL CALL BACK FOR ORDERS 
REGARDING POSITIVE C-DIFF.

## 2019-09-16 NOTE — NUR
RN CLOSING NOTES:

PT APPEARS SLEEPY NOW, CALM, SITTER REMAINS AT BED SIDE. PT AWAKE THE ENTIRE SHIFT. VS 
REMAINS STABLE, NEEDS ATTENDED. SAFETY PRECAUTIONS FOR FALL REMAINS ENGAGED, CALL LIGHT IN 
REACH, WILL ENDORSE TO DAY RN FOR CONTINUITY OF CARE.

## 2019-09-16 NOTE — NUR
MS RN NOTES



RECEIVED A CALL FROM LAB AND SPOKE TO RAYO REGARDING POSITIVE C-D-FF RESULT. PAGE EPIC, 
AWAITING FOR DR. GOINS FOR CALL BACK.

## 2019-09-17 LAB
ALBUMIN SERPL BCP-MCNC: 3 G/DL (ref 3.4–5)
ALP SERPL-CCNC: 106 U/L (ref 46–116)
ALT SERPL W P-5'-P-CCNC: 41 U/L (ref 12–78)
APPEARANCE UR: (no result)
AST SERPL W P-5'-P-CCNC: 39 U/L (ref 15–37)
BASOPHILS # BLD AUTO: 0 /CMM (ref 0–0.2)
BASOPHILS NFR BLD AUTO: 0.4 % (ref 0–2)
BILIRUB SERPL-MCNC: 0.7 MG/DL (ref 0.2–1)
BILIRUB UR QL STRIP: NEGATIVE
BUN SERPL-MCNC: 25 MG/DL (ref 7–18)
CALCIUM SERPL-MCNC: 9.1 MG/DL (ref 8.5–10.1)
CHLORIDE SERPL-SCNC: 106 MMOL/L (ref 98–107)
CHOLEST SERPL-MCNC: 125 MG/DL (ref ?–200)
CO2 SERPL-SCNC: 29 MMOL/L (ref 21–32)
COLOR UR: (no result)
CREAT SERPL-MCNC: 0.9 MG/DL (ref 0.6–1.3)
EOSINOPHIL NFR BLD AUTO: 0.3 % (ref 0–6)
GLUCOSE SERPL-MCNC: 118 MG/DL (ref 74–106)
GLUCOSE UR STRIP-MCNC: NEGATIVE MG/DL
HCT VFR BLD AUTO: 42 % (ref 39–51)
HDLC SERPL-MCNC: 21 MG/DL (ref 40–60)
HGB BLD-MCNC: 14 G/DL (ref 13.5–17.5)
HGB UR QL STRIP: NEGATIVE ERY/UL
KETONES UR STRIP-MCNC: NEGATIVE MG/DL
LDLC SERPL DIRECT ASSAY-MCNC: 90 MG/DL (ref 0–99)
LEUKOCYTE ESTERASE UR QL STRIP: NEGATIVE
LYMPHOCYTES NFR BLD AUTO: 1.3 /CMM (ref 0.8–4.8)
LYMPHOCYTES NFR BLD AUTO: 10.8 % (ref 20–44)
MAGNESIUM SERPL-MCNC: 2.2 MG/DL (ref 1.8–2.4)
MCHC RBC AUTO-ENTMCNC: 33 G/DL (ref 31–36)
MCV RBC AUTO: 89 FL (ref 80–96)
MONOCYTES NFR BLD AUTO: 0.9 /CMM (ref 0.1–1.3)
MONOCYTES NFR BLD AUTO: 7.6 % (ref 2–12)
NEUTROPHILS # BLD AUTO: 9.5 /CMM (ref 1.8–8.9)
NEUTROPHILS NFR BLD AUTO: 80.9 % (ref 43–81)
NITRITE UR QL STRIP: POSITIVE
PH UR STRIP: 5.5 [PH] (ref 5–8)
PHOSPHATE SERPL-MCNC: 4.4 MG/DL (ref 2.5–4.9)
PLATELET # BLD AUTO: 168 /CMM (ref 150–450)
POTASSIUM SERPL-SCNC: 4.1 MMOL/L (ref 3.5–5.1)
PROT SERPL-MCNC: 7.6 G/DL (ref 6.4–8.2)
PROT UR QL STRIP: (no result) MG/DL
RBC # BLD AUTO: 4.71 MIL/UL (ref 4.5–6)
RBC #/AREA URNS HPF: (no result) /HPF (ref 0–2)
SODIUM SERPL-SCNC: 144 MMOL/L (ref 136–145)
TRIGL SERPL-MCNC: 97 MG/DL (ref 30–150)
UROBILINOGEN UR STRIP-MCNC: 0.2 EU/DL
WBC #/AREA URNS HPF: (no result) /HPF (ref 0–3)
WBC NRBC COR # BLD AUTO: 11.8 K/UL (ref 4.3–11)

## 2019-09-17 RX ADMIN — METRONIDAZOLE SCH MG: 500 TABLET ORAL at 08:42

## 2019-09-17 RX ADMIN — FOLIC ACID SCH MG: 1 TABLET ORAL at 08:41

## 2019-09-17 RX ADMIN — DIVALPROEX SODIUM SCH MG: 250 TABLET, DELAYED RELEASE ORAL at 17:48

## 2019-09-17 RX ADMIN — FAMOTIDINE SCH MG: 20 TABLET, FILM COATED ORAL at 17:47

## 2019-09-17 RX ADMIN — METRONIDAZOLE SCH MG: 500 TABLET ORAL at 00:32

## 2019-09-17 RX ADMIN — RISPERIDONE SCH MG: 0.5 TABLET, ORALLY DISINTEGRATING ORAL at 20:15

## 2019-09-17 RX ADMIN — VALPROIC ACID SCH MG: 250 SOLUTION ORAL at 08:41

## 2019-09-17 RX ADMIN — VANCOMYCIN HYDROCHLORIDE SCH MG: 125 CAPSULE ORAL at 00:32

## 2019-09-17 RX ADMIN — VANCOMYCIN HYDROCHLORIDE SCH MG: 125 CAPSULE ORAL at 17:48

## 2019-09-17 RX ADMIN — SODIUM CHLORIDE PRN MLS/HR: 9 INJECTION, SOLUTION INTRAVENOUS at 17:47

## 2019-09-17 RX ADMIN — OXYCODONE HYDROCHLORIDE AND ACETAMINOPHEN SCH MG: 500 TABLET ORAL at 17:48

## 2019-09-17 RX ADMIN — RISPERIDONE SCH MG: 0.5 TABLET, ORALLY DISINTEGRATING ORAL at 12:25

## 2019-09-17 RX ADMIN — VANCOMYCIN HYDROCHLORIDE SCH MG: 125 CAPSULE ORAL at 06:20

## 2019-09-17 RX ADMIN — ALBUTEROL SULFATE SCH MG: 2.5 SOLUTION RESPIRATORY (INHALATION) at 19:30

## 2019-09-17 RX ADMIN — VALPROIC ACID SCH MG: 250 SOLUTION ORAL at 20:15

## 2019-09-17 RX ADMIN — FAMOTIDINE SCH MG: 20 TABLET, FILM COATED ORAL at 08:41

## 2019-09-17 RX ADMIN — METRONIDAZOLE SCH MG: 500 TABLET ORAL at 23:36

## 2019-09-17 RX ADMIN — AMLODIPINE BESYLATE SCH MG: 10 TABLET ORAL at 08:43

## 2019-09-17 RX ADMIN — METRONIDAZOLE SCH MG: 500 TABLET ORAL at 17:48

## 2019-09-17 RX ADMIN — VANCOMYCIN HYDROCHLORIDE SCH MG: 125 CAPSULE ORAL at 12:27

## 2019-09-17 RX ADMIN — BENZTROPINE MESYLATE SCH MG: 1 TABLET ORAL at 20:15

## 2019-09-17 RX ADMIN — Medication SCH EACH: at 17:48

## 2019-09-17 RX ADMIN — BENZTROPINE MESYLATE SCH MG: 1 TABLET ORAL at 12:26

## 2019-09-17 RX ADMIN — TEMAZEPAM PRN MG: 15 CAPSULE ORAL at 22:03

## 2019-09-17 RX ADMIN — TEMAZEPAM PRN MG: 15 CAPSULE ORAL at 00:32

## 2019-09-17 RX ADMIN — OXYCODONE HYDROCHLORIDE AND ACETAMINOPHEN SCH MG: 500 TABLET ORAL at 08:41

## 2019-09-17 RX ADMIN — Medication SCH EACH: at 08:41

## 2019-09-17 RX ADMIN — BENZTROPINE MESYLATE SCH MG: 1 TABLET ORAL at 08:41

## 2019-09-17 RX ADMIN — RISPERIDONE SCH MG: 0.5 TABLET, ORALLY DISINTEGRATING ORAL at 08:42

## 2019-09-17 RX ADMIN — VANCOMYCIN HYDROCHLORIDE SCH MG: 125 CAPSULE ORAL at 23:36

## 2019-09-18 VITALS — SYSTOLIC BLOOD PRESSURE: 121 MMHG | DIASTOLIC BLOOD PRESSURE: 58 MMHG

## 2019-09-18 VITALS — SYSTOLIC BLOOD PRESSURE: 117 MMHG | DIASTOLIC BLOOD PRESSURE: 75 MMHG

## 2019-09-18 LAB
BASOPHILS # BLD AUTO: 0 /CMM (ref 0–0.2)
BASOPHILS NFR BLD AUTO: 0.5 % (ref 0–2)
BUN SERPL-MCNC: 24 MG/DL (ref 7–18)
CALCIUM SERPL-MCNC: 8.6 MG/DL (ref 8.5–10.1)
CHLORIDE SERPL-SCNC: 106 MMOL/L (ref 98–107)
CO2 SERPL-SCNC: 26 MMOL/L (ref 21–32)
CREAT SERPL-MCNC: 0.8 MG/DL (ref 0.6–1.3)
EOSINOPHIL NFR BLD AUTO: 1.7 % (ref 0–6)
GLUCOSE SERPL-MCNC: 149 MG/DL (ref 74–106)
HCT VFR BLD AUTO: 40 % (ref 39–51)
HGB BLD-MCNC: 13.3 G/DL (ref 13.5–17.5)
LYMPHOCYTES NFR BLD AUTO: 1.2 /CMM (ref 0.8–4.8)
LYMPHOCYTES NFR BLD AUTO: 13.7 % (ref 20–44)
MAGNESIUM SERPL-MCNC: 2.2 MG/DL (ref 1.8–2.4)
MCHC RBC AUTO-ENTMCNC: 33 G/DL (ref 31–36)
MCV RBC AUTO: 90 FL (ref 80–96)
MONOCYTES NFR BLD AUTO: 0.9 /CMM (ref 0.1–1.3)
MONOCYTES NFR BLD AUTO: 9.9 % (ref 2–12)
NEUTROPHILS # BLD AUTO: 6.7 /CMM (ref 1.8–8.9)
NEUTROPHILS NFR BLD AUTO: 74.2 % (ref 43–81)
PHOSPHATE SERPL-MCNC: 3.5 MG/DL (ref 2.5–4.9)
PLATELET # BLD AUTO: 158 /CMM (ref 150–450)
POTASSIUM SERPL-SCNC: 3.3 MMOL/L (ref 3.5–5.1)
RBC # BLD AUTO: 4.48 MIL/UL (ref 4.5–6)
SODIUM SERPL-SCNC: 145 MMOL/L (ref 136–145)
WBC NRBC COR # BLD AUTO: 9 K/UL (ref 4.3–11)

## 2019-09-18 RX ADMIN — OXYCODONE HYDROCHLORIDE AND ACETAMINOPHEN SCH MG: 500 TABLET ORAL at 09:47

## 2019-09-18 RX ADMIN — ALBUTEROL SULFATE SCH MG: 2.5 SOLUTION RESPIRATORY (INHALATION) at 12:43

## 2019-09-18 RX ADMIN — FAMOTIDINE SCH MG: 20 TABLET, FILM COATED ORAL at 16:58

## 2019-09-18 RX ADMIN — VALPROIC ACID SCH MG: 250 SOLUTION ORAL at 21:05

## 2019-09-18 RX ADMIN — ALBUTEROL SULFATE SCH MG: 2.5 SOLUTION RESPIRATORY (INHALATION) at 01:30

## 2019-09-18 RX ADMIN — VANCOMYCIN HYDROCHLORIDE SCH MG: 125 CAPSULE ORAL at 12:31

## 2019-09-18 RX ADMIN — VANCOMYCIN HYDROCHLORIDE SCH MG: 125 CAPSULE ORAL at 23:01

## 2019-09-18 RX ADMIN — TEMAZEPAM PRN MG: 15 CAPSULE ORAL at 21:05

## 2019-09-18 RX ADMIN — FAMOTIDINE SCH MG: 20 TABLET, FILM COATED ORAL at 09:47

## 2019-09-18 RX ADMIN — Medication SCH EACH: at 16:57

## 2019-09-18 RX ADMIN — DIVALPROEX SODIUM SCH MG: 250 TABLET, DELAYED RELEASE ORAL at 16:58

## 2019-09-18 RX ADMIN — RISPERIDONE PRN MG: 0.5 TABLET, ORALLY DISINTEGRATING ORAL at 23:00

## 2019-09-18 RX ADMIN — RISPERIDONE PRN MG: 0.5 TABLET, ORALLY DISINTEGRATING ORAL at 00:42

## 2019-09-18 RX ADMIN — VANCOMYCIN HYDROCHLORIDE SCH MG: 125 CAPSULE ORAL at 17:01

## 2019-09-18 RX ADMIN — METRONIDAZOLE SCH MG: 500 TABLET ORAL at 09:24

## 2019-09-18 RX ADMIN — VALPROIC ACID SCH MG: 250 SOLUTION ORAL at 09:46

## 2019-09-18 RX ADMIN — ALBUTEROL SULFATE SCH MG: 2.5 SOLUTION RESPIRATORY (INHALATION) at 07:35

## 2019-09-18 RX ADMIN — FOLIC ACID SCH MG: 1 TABLET ORAL at 09:46

## 2019-09-18 RX ADMIN — RISPERIDONE SCH MG: 0.5 TABLET, ORALLY DISINTEGRATING ORAL at 09:00

## 2019-09-18 RX ADMIN — BENZTROPINE MESYLATE SCH MG: 1 TABLET ORAL at 09:47

## 2019-09-18 RX ADMIN — ALBUTEROL SULFATE SCH MG: 2.5 SOLUTION RESPIRATORY (INHALATION) at 19:30

## 2019-09-18 RX ADMIN — AMLODIPINE BESYLATE SCH MG: 10 TABLET ORAL at 09:47

## 2019-09-18 RX ADMIN — METRONIDAZOLE SCH MG: 500 TABLET ORAL at 23:01

## 2019-09-18 RX ADMIN — VANCOMYCIN HYDROCHLORIDE SCH MG: 125 CAPSULE ORAL at 06:15

## 2019-09-18 RX ADMIN — OXYCODONE HYDROCHLORIDE AND ACETAMINOPHEN SCH MG: 500 TABLET ORAL at 16:57

## 2019-09-18 RX ADMIN — BENZTROPINE MESYLATE SCH MG: 1 TABLET ORAL at 21:05

## 2019-09-18 RX ADMIN — METRONIDAZOLE SCH MG: 500 TABLET ORAL at 16:57

## 2019-09-18 RX ADMIN — BENZTROPINE MESYLATE SCH MG: 1 TABLET ORAL at 12:26

## 2019-09-18 RX ADMIN — SODIUM CHLORIDE PRN MLS/HR: 9 INJECTION, SOLUTION INTRAVENOUS at 08:05

## 2019-09-18 RX ADMIN — RISPERIDONE SCH MG: 0.5 TABLET, ORALLY DISINTEGRATING ORAL at 12:26

## 2019-09-18 RX ADMIN — Medication SCH EACH: at 09:46

## 2019-09-19 VITALS — SYSTOLIC BLOOD PRESSURE: 117 MMHG | DIASTOLIC BLOOD PRESSURE: 63 MMHG

## 2019-09-19 VITALS — SYSTOLIC BLOOD PRESSURE: 136 MMHG | DIASTOLIC BLOOD PRESSURE: 87 MMHG

## 2019-09-19 VITALS — SYSTOLIC BLOOD PRESSURE: 131 MMHG | DIASTOLIC BLOOD PRESSURE: 55 MMHG

## 2019-09-19 LAB
ALBUMIN SERPL BCP-MCNC: 2.7 G/DL (ref 3.4–5)
ALP SERPL-CCNC: 95 U/L (ref 46–116)
ALT SERPL W P-5'-P-CCNC: 40 U/L (ref 12–78)
AST SERPL W P-5'-P-CCNC: 46 U/L (ref 15–37)
BASOPHILS # BLD AUTO: 0 /CMM (ref 0–0.2)
BASOPHILS NFR BLD AUTO: 0.6 % (ref 0–2)
BILIRUB SERPL-MCNC: 0.4 MG/DL (ref 0.2–1)
BUN SERPL-MCNC: 17 MG/DL (ref 7–18)
CALCIUM SERPL-MCNC: 8.3 MG/DL (ref 8.5–10.1)
CHLORIDE SERPL-SCNC: 111 MMOL/L (ref 98–107)
CO2 SERPL-SCNC: 28 MMOL/L (ref 21–32)
CREAT SERPL-MCNC: 0.7 MG/DL (ref 0.6–1.3)
EOSINOPHIL NFR BLD AUTO: 3.4 % (ref 0–6)
GLUCOSE SERPL-MCNC: 97 MG/DL (ref 74–106)
HCT VFR BLD AUTO: 39 % (ref 39–51)
HGB BLD-MCNC: 12.9 G/DL (ref 13.5–17.5)
LYMPHOCYTES NFR BLD AUTO: 1.6 /CMM (ref 0.8–4.8)
LYMPHOCYTES NFR BLD AUTO: 22.3 % (ref 20–44)
MAGNESIUM SERPL-MCNC: 2.1 MG/DL (ref 1.8–2.4)
MCHC RBC AUTO-ENTMCNC: 33 G/DL (ref 31–36)
MCV RBC AUTO: 89 FL (ref 80–96)
MONOCYTES NFR BLD AUTO: 0.7 /CMM (ref 0.1–1.3)
MONOCYTES NFR BLD AUTO: 10.2 % (ref 2–12)
NEUTROPHILS # BLD AUTO: 4.6 /CMM (ref 1.8–8.9)
NEUTROPHILS NFR BLD AUTO: 63.5 % (ref 43–81)
PHOSPHATE SERPL-MCNC: 3 MG/DL (ref 2.5–4.9)
PLATELET # BLD AUTO: 162 /CMM (ref 150–450)
POTASSIUM SERPL-SCNC: 3.5 MMOL/L (ref 3.5–5.1)
PROT SERPL-MCNC: 6.7 G/DL (ref 6.4–8.2)
RBC # BLD AUTO: 4.39 MIL/UL (ref 4.5–6)
SODIUM SERPL-SCNC: 149 MMOL/L (ref 136–145)
VALPROATE SERPL-MCNC: 66 UG/ML (ref 50–100)
WBC NRBC COR # BLD AUTO: 7.2 K/UL (ref 4.3–11)

## 2019-09-19 PROCEDURE — 0JBP0ZZ EXCISION OF LEFT LOWER LEG SUBCUTANEOUS TISSUE AND FASCIA, OPEN APPROACH: ICD-10-PCS | Performed by: PODIATRIST

## 2019-09-19 PROCEDURE — 0JBR0ZZ EXCISION OF LEFT FOOT SUBCUTANEOUS TISSUE AND FASCIA, OPEN APPROACH: ICD-10-PCS | Performed by: PODIATRIST

## 2019-09-19 RX ADMIN — Medication SCH EACH: at 16:07

## 2019-09-19 RX ADMIN — VALPROIC ACID SCH MG: 250 SOLUTION ORAL at 20:56

## 2019-09-19 RX ADMIN — SODIUM CHLORIDE PRN MLS/HR: 9 INJECTION, SOLUTION INTRAVENOUS at 15:29

## 2019-09-19 RX ADMIN — METRONIDAZOLE SCH MG: 500 TABLET ORAL at 16:07

## 2019-09-19 RX ADMIN — AMLODIPINE BESYLATE SCH MG: 10 TABLET ORAL at 08:08

## 2019-09-19 RX ADMIN — BENZTROPINE MESYLATE SCH MG: 1 TABLET ORAL at 08:08

## 2019-09-19 RX ADMIN — FOLIC ACID SCH MG: 1 TABLET ORAL at 08:08

## 2019-09-19 RX ADMIN — METRONIDAZOLE SCH MG: 500 TABLET ORAL at 23:18

## 2019-09-19 RX ADMIN — VANCOMYCIN HYDROCHLORIDE SCH MG: 125 CAPSULE ORAL at 11:13

## 2019-09-19 RX ADMIN — ALBUTEROL SULFATE SCH MG: 2.5 SOLUTION RESPIRATORY (INHALATION) at 07:35

## 2019-09-19 RX ADMIN — VANCOMYCIN HYDROCHLORIDE SCH MG: 125 CAPSULE ORAL at 18:10

## 2019-09-19 RX ADMIN — VALPROIC ACID SCH MG: 250 SOLUTION ORAL at 08:08

## 2019-09-19 RX ADMIN — DEXTROSE MONOHYDRATE SCH MLS/HR: 50 INJECTION, SOLUTION INTRAVENOUS at 16:12

## 2019-09-19 RX ADMIN — QUETIAPINE SCH MG: 25 TABLET, FILM COATED ORAL at 22:01

## 2019-09-19 RX ADMIN — TEMAZEPAM PRN MG: 15 CAPSULE ORAL at 20:58

## 2019-09-19 RX ADMIN — Medication PRN MG: at 20:55

## 2019-09-19 RX ADMIN — OXYCODONE HYDROCHLORIDE AND ACETAMINOPHEN SCH MG: 500 TABLET ORAL at 16:07

## 2019-09-19 RX ADMIN — SODIUM CHLORIDE PRN MLS/HR: 9 INJECTION, SOLUTION INTRAVENOUS at 06:52

## 2019-09-19 RX ADMIN — ALBUTEROL SULFATE SCH MG: 2.5 SOLUTION RESPIRATORY (INHALATION) at 13:30

## 2019-09-19 RX ADMIN — METRONIDAZOLE SCH MG: 500 TABLET ORAL at 08:08

## 2019-09-19 RX ADMIN — ALBUTEROL SULFATE SCH MG: 2.5 SOLUTION RESPIRATORY (INHALATION) at 19:30

## 2019-09-19 RX ADMIN — BUPROPION HYDROCHLORIDE SCH MG: 100 TABLET, EXTENDED RELEASE ORAL at 08:42

## 2019-09-19 RX ADMIN — FAMOTIDINE SCH MG: 20 TABLET, FILM COATED ORAL at 16:07

## 2019-09-19 RX ADMIN — OXYCODONE HYDROCHLORIDE AND ACETAMINOPHEN SCH MG: 500 TABLET ORAL at 08:08

## 2019-09-19 RX ADMIN — Medication SCH EACH: at 08:08

## 2019-09-19 RX ADMIN — VANCOMYCIN HYDROCHLORIDE SCH MG: 125 CAPSULE ORAL at 06:08

## 2019-09-19 RX ADMIN — VANCOMYCIN HYDROCHLORIDE SCH MG: 125 CAPSULE ORAL at 23:18

## 2019-09-19 RX ADMIN — ALBUTEROL SULFATE SCH MG: 2.5 SOLUTION RESPIRATORY (INHALATION) at 02:45

## 2019-09-19 RX ADMIN — FAMOTIDINE SCH MG: 20 TABLET, FILM COATED ORAL at 08:08

## 2019-09-20 VITALS — SYSTOLIC BLOOD PRESSURE: 101 MMHG | DIASTOLIC BLOOD PRESSURE: 56 MMHG

## 2019-09-20 VITALS — SYSTOLIC BLOOD PRESSURE: 122 MMHG | DIASTOLIC BLOOD PRESSURE: 81 MMHG

## 2019-09-20 VITALS — SYSTOLIC BLOOD PRESSURE: 131 MMHG | DIASTOLIC BLOOD PRESSURE: 71 MMHG

## 2019-09-20 LAB
BASOPHILS # BLD AUTO: 0.1 /CMM (ref 0–0.2)
BASOPHILS NFR BLD AUTO: 1 % (ref 0–2)
BUN SERPL-MCNC: 10 MG/DL (ref 7–18)
CALCIUM SERPL-MCNC: 8.1 MG/DL (ref 8.5–10.1)
CHLORIDE SERPL-SCNC: 112 MMOL/L (ref 98–107)
CO2 SERPL-SCNC: 28 MMOL/L (ref 21–32)
CREAT SERPL-MCNC: 0.6 MG/DL (ref 0.6–1.3)
EOSINOPHIL NFR BLD AUTO: 3.3 % (ref 0–6)
GLUCOSE SERPL-MCNC: 105 MG/DL (ref 74–106)
HCT VFR BLD AUTO: 37 % (ref 39–51)
HGB BLD-MCNC: 12.4 G/DL (ref 13.5–17.5)
LYMPHOCYTES NFR BLD AUTO: 1.8 /CMM (ref 0.8–4.8)
LYMPHOCYTES NFR BLD AUTO: 28.5 % (ref 20–44)
MAGNESIUM SERPL-MCNC: 2.1 MG/DL (ref 1.8–2.4)
MCHC RBC AUTO-ENTMCNC: 34 G/DL (ref 31–36)
MCV RBC AUTO: 88 FL (ref 80–96)
MONOCYTES NFR BLD AUTO: 0.7 /CMM (ref 0.1–1.3)
MONOCYTES NFR BLD AUTO: 10.1 % (ref 2–12)
NEUTROPHILS # BLD AUTO: 3.7 /CMM (ref 1.8–8.9)
NEUTROPHILS NFR BLD AUTO: 57.1 % (ref 43–81)
PHOSPHATE SERPL-MCNC: 2.9 MG/DL (ref 2.5–4.9)
PLATELET # BLD AUTO: 159 /CMM (ref 150–450)
POTASSIUM SERPL-SCNC: 3.5 MMOL/L (ref 3.5–5.1)
RBC # BLD AUTO: 4.17 MIL/UL (ref 4.5–6)
SODIUM SERPL-SCNC: 148 MMOL/L (ref 136–145)
WBC NRBC COR # BLD AUTO: 6.5 K/UL (ref 4.3–11)

## 2019-09-20 RX ADMIN — OXYCODONE HYDROCHLORIDE AND ACETAMINOPHEN SCH MG: 500 TABLET ORAL at 16:21

## 2019-09-20 RX ADMIN — METRONIDAZOLE SCH MG: 500 TABLET ORAL at 23:16

## 2019-09-20 RX ADMIN — ALBUTEROL SULFATE SCH MG: 2.5 SOLUTION RESPIRATORY (INHALATION) at 07:35

## 2019-09-20 RX ADMIN — ALBUTEROL SULFATE SCH MG: 2.5 SOLUTION RESPIRATORY (INHALATION) at 13:30

## 2019-09-20 RX ADMIN — VANCOMYCIN HYDROCHLORIDE SCH MG: 125 CAPSULE ORAL at 23:13

## 2019-09-20 RX ADMIN — SODIUM HYPOCHLORITE SCH ML: 2.5 SOLUTION TOPICAL at 08:17

## 2019-09-20 RX ADMIN — Medication PRN MG: at 03:20

## 2019-09-20 RX ADMIN — VANCOMYCIN HYDROCHLORIDE SCH MG: 125 CAPSULE ORAL at 17:06

## 2019-09-20 RX ADMIN — QUETIAPINE SCH MG: 25 TABLET, FILM COATED ORAL at 22:00

## 2019-09-20 RX ADMIN — FAMOTIDINE SCH MG: 20 TABLET, FILM COATED ORAL at 16:07

## 2019-09-20 RX ADMIN — ALBUTEROL SULFATE SCH MG: 2.5 SOLUTION RESPIRATORY (INHALATION) at 19:30

## 2019-09-20 RX ADMIN — METRONIDAZOLE SCH MG: 500 TABLET ORAL at 08:14

## 2019-09-20 RX ADMIN — Medication SCH EACH: at 08:14

## 2019-09-20 RX ADMIN — FOLIC ACID SCH MG: 1 TABLET ORAL at 08:14

## 2019-09-20 RX ADMIN — METRONIDAZOLE SCH MG: 500 TABLET ORAL at 16:07

## 2019-09-20 RX ADMIN — FAMOTIDINE SCH MG: 20 TABLET, FILM COATED ORAL at 08:14

## 2019-09-20 RX ADMIN — VANCOMYCIN HYDROCHLORIDE SCH MG: 125 CAPSULE ORAL at 05:37

## 2019-09-20 RX ADMIN — VALPROIC ACID SCH MG: 250 SOLUTION ORAL at 21:59

## 2019-09-20 RX ADMIN — DEXTROSE MONOHYDRATE SCH MLS/HR: 50 INJECTION, SOLUTION INTRAVENOUS at 16:08

## 2019-09-20 RX ADMIN — VANCOMYCIN HYDROCHLORIDE SCH MG: 125 CAPSULE ORAL at 12:31

## 2019-09-20 RX ADMIN — SODIUM CHLORIDE PRN MLS/HR: 9 INJECTION, SOLUTION INTRAVENOUS at 09:33

## 2019-09-20 RX ADMIN — OXYCODONE HYDROCHLORIDE AND ACETAMINOPHEN SCH MG: 500 TABLET ORAL at 08:14

## 2019-09-20 RX ADMIN — Medication SCH EACH: at 16:07

## 2019-09-20 RX ADMIN — TEMAZEPAM PRN MG: 15 CAPSULE ORAL at 22:03

## 2019-09-20 RX ADMIN — BUPROPION HYDROCHLORIDE SCH MG: 100 TABLET, EXTENDED RELEASE ORAL at 08:12

## 2019-09-20 RX ADMIN — ALBUTEROL SULFATE SCH MG: 2.5 SOLUTION RESPIRATORY (INHALATION) at 01:30

## 2019-09-20 RX ADMIN — DEXTROSE MONOHYDRATE SCH MLS/HR: 50 INJECTION, SOLUTION INTRAVENOUS at 03:47

## 2019-09-20 RX ADMIN — VALPROIC ACID SCH MG: 250 SOLUTION ORAL at 08:15

## 2019-09-20 RX ADMIN — SODIUM CHLORIDE PRN MLS/HR: 9 INJECTION, SOLUTION INTRAVENOUS at 00:27

## 2019-09-20 RX ADMIN — AMLODIPINE BESYLATE SCH MG: 10 TABLET ORAL at 08:15

## 2019-09-21 VITALS — DIASTOLIC BLOOD PRESSURE: 69 MMHG | SYSTOLIC BLOOD PRESSURE: 106 MMHG

## 2019-09-21 VITALS — SYSTOLIC BLOOD PRESSURE: 130 MMHG | DIASTOLIC BLOOD PRESSURE: 74 MMHG

## 2019-09-21 LAB
BASOPHILS # BLD AUTO: 0.1 /CMM (ref 0–0.2)
BASOPHILS NFR BLD AUTO: 0.8 % (ref 0–2)
BUN SERPL-MCNC: 8 MG/DL (ref 7–18)
CALCIUM SERPL-MCNC: 8.1 MG/DL (ref 8.5–10.1)
CHLORIDE SERPL-SCNC: 109 MMOL/L (ref 98–107)
CO2 SERPL-SCNC: 29 MMOL/L (ref 21–32)
CREAT SERPL-MCNC: 0.6 MG/DL (ref 0.6–1.3)
EOSINOPHIL NFR BLD AUTO: 3.2 % (ref 0–6)
GLUCOSE SERPL-MCNC: 78 MG/DL (ref 74–106)
HCT VFR BLD AUTO: 38 % (ref 39–51)
HGB BLD-MCNC: 12.7 G/DL (ref 13.5–17.5)
LYMPHOCYTES NFR BLD AUTO: 1.9 /CMM (ref 0.8–4.8)
LYMPHOCYTES NFR BLD AUTO: 29.6 % (ref 20–44)
MAGNESIUM SERPL-MCNC: 2 MG/DL (ref 1.8–2.4)
MCHC RBC AUTO-ENTMCNC: 33 G/DL (ref 31–36)
MCV RBC AUTO: 89 FL (ref 80–96)
MONOCYTES NFR BLD AUTO: 0.6 /CMM (ref 0.1–1.3)
MONOCYTES NFR BLD AUTO: 9.2 % (ref 2–12)
NEUTROPHILS # BLD AUTO: 3.7 /CMM (ref 1.8–8.9)
NEUTROPHILS NFR BLD AUTO: 57.2 % (ref 43–81)
PHOSPHATE SERPL-MCNC: 3.3 MG/DL (ref 2.5–4.9)
PLATELET # BLD AUTO: 152 /CMM (ref 150–450)
POTASSIUM SERPL-SCNC: 3.4 MMOL/L (ref 3.5–5.1)
RBC # BLD AUTO: 4.28 MIL/UL (ref 4.5–6)
SODIUM SERPL-SCNC: 144 MMOL/L (ref 136–145)
WBC NRBC COR # BLD AUTO: 6.4 K/UL (ref 4.3–11)

## 2019-09-21 RX ADMIN — POTASSIUM CHLORIDE SCH MEQ: 1500 TABLET, EXTENDED RELEASE ORAL at 10:07

## 2019-09-21 RX ADMIN — LINEZOLID SCH MG: 600 TABLET, FILM COATED ORAL at 20:05

## 2019-09-21 RX ADMIN — FAMOTIDINE SCH MG: 20 TABLET, FILM COATED ORAL at 08:39

## 2019-09-21 RX ADMIN — QUETIAPINE SCH MG: 25 TABLET, FILM COATED ORAL at 21:13

## 2019-09-21 RX ADMIN — BUPROPION HYDROCHLORIDE SCH MG: 100 TABLET, EXTENDED RELEASE ORAL at 09:25

## 2019-09-21 RX ADMIN — ALBUTEROL SULFATE SCH MG: 2.5 SOLUTION RESPIRATORY (INHALATION) at 01:30

## 2019-09-21 RX ADMIN — Medication PRN MG: at 22:37

## 2019-09-21 RX ADMIN — POTASSIUM CHLORIDE SCH MEQ: 1500 TABLET, EXTENDED RELEASE ORAL at 11:27

## 2019-09-21 RX ADMIN — ALBUTEROL SULFATE SCH MG: 2.5 SOLUTION RESPIRATORY (INHALATION) at 07:35

## 2019-09-21 RX ADMIN — POTASSIUM CHLORIDE SCH MEQ: 1500 TABLET, EXTENDED RELEASE ORAL at 13:35

## 2019-09-21 RX ADMIN — DEXTROSE MONOHYDRATE SCH MLS/HR: 50 INJECTION, SOLUTION INTRAVENOUS at 03:55

## 2019-09-21 RX ADMIN — VALPROIC ACID SCH MG: 250 SOLUTION ORAL at 20:05

## 2019-09-21 RX ADMIN — METRONIDAZOLE SCH MG: 500 TABLET ORAL at 08:38

## 2019-09-21 RX ADMIN — FOLIC ACID SCH MG: 1 TABLET ORAL at 08:39

## 2019-09-21 RX ADMIN — Medication SCH EACH: at 08:38

## 2019-09-21 RX ADMIN — Medication SCH EACH: at 17:35

## 2019-09-21 RX ADMIN — OXYCODONE HYDROCHLORIDE AND ACETAMINOPHEN SCH MG: 500 TABLET ORAL at 08:39

## 2019-09-21 RX ADMIN — SODIUM HYPOCHLORITE SCH ML: 2.5 SOLUTION TOPICAL at 09:04

## 2019-09-21 RX ADMIN — VANCOMYCIN HYDROCHLORIDE SCH MG: 125 CAPSULE ORAL at 17:35

## 2019-09-21 RX ADMIN — OXYCODONE HYDROCHLORIDE AND ACETAMINOPHEN SCH MG: 500 TABLET ORAL at 17:35

## 2019-09-21 RX ADMIN — VALPROIC ACID SCH MG: 250 SOLUTION ORAL at 08:38

## 2019-09-21 RX ADMIN — VANCOMYCIN HYDROCHLORIDE SCH MG: 125 CAPSULE ORAL at 11:27

## 2019-09-21 RX ADMIN — DEXTROSE MONOHYDRATE SCH MLS/HR: 50 INJECTION, SOLUTION INTRAVENOUS at 16:21

## 2019-09-21 RX ADMIN — ALBUTEROL SULFATE SCH MG: 2.5 SOLUTION RESPIRATORY (INHALATION) at 18:57

## 2019-09-21 RX ADMIN — ALBUTEROL SULFATE SCH MG: 2.5 SOLUTION RESPIRATORY (INHALATION) at 13:30

## 2019-09-21 RX ADMIN — VANCOMYCIN HYDROCHLORIDE SCH MG: 125 CAPSULE ORAL at 06:02

## 2019-09-21 RX ADMIN — AMLODIPINE BESYLATE SCH MG: 10 TABLET ORAL at 08:38

## 2019-09-21 RX ADMIN — METRONIDAZOLE SCH MG: 500 TABLET ORAL at 17:38

## 2019-09-21 RX ADMIN — FAMOTIDINE SCH MG: 20 TABLET, FILM COATED ORAL at 17:35

## 2019-09-22 VITALS — DIASTOLIC BLOOD PRESSURE: 72 MMHG | SYSTOLIC BLOOD PRESSURE: 150 MMHG

## 2019-09-22 VITALS — DIASTOLIC BLOOD PRESSURE: 72 MMHG | SYSTOLIC BLOOD PRESSURE: 128 MMHG

## 2019-09-22 LAB
BASOPHILS # BLD AUTO: 0.1 /CMM (ref 0–0.2)
BASOPHILS NFR BLD AUTO: 1 % (ref 0–2)
BUN SERPL-MCNC: 7 MG/DL (ref 7–18)
CALCIUM SERPL-MCNC: 8.5 MG/DL (ref 8.5–10.1)
CHLORIDE SERPL-SCNC: 105 MMOL/L (ref 98–107)
CO2 SERPL-SCNC: 27 MMOL/L (ref 21–32)
CREAT SERPL-MCNC: 0.6 MG/DL (ref 0.6–1.3)
EOSINOPHIL NFR BLD AUTO: 3.5 % (ref 0–6)
GLUCOSE SERPL-MCNC: 90 MG/DL (ref 74–106)
HCT VFR BLD AUTO: 39 % (ref 39–51)
HGB BLD-MCNC: 13.1 G/DL (ref 13.5–17.5)
LYMPHOCYTES NFR BLD AUTO: 1.5 /CMM (ref 0.8–4.8)
LYMPHOCYTES NFR BLD AUTO: 24.7 % (ref 20–44)
MAGNESIUM SERPL-MCNC: 2 MG/DL (ref 1.8–2.4)
MCHC RBC AUTO-ENTMCNC: 34 G/DL (ref 31–36)
MCV RBC AUTO: 88 FL (ref 80–96)
MONOCYTES NFR BLD AUTO: 0.7 /CMM (ref 0.1–1.3)
MONOCYTES NFR BLD AUTO: 11.1 % (ref 2–12)
NEUTROPHILS # BLD AUTO: 3.6 /CMM (ref 1.8–8.9)
NEUTROPHILS NFR BLD AUTO: 59.7 % (ref 43–81)
PHOSPHATE SERPL-MCNC: 3 MG/DL (ref 2.5–4.9)
PLATELET # BLD AUTO: 141 /CMM (ref 150–450)
POTASSIUM SERPL-SCNC: 3.8 MMOL/L (ref 3.5–5.1)
RBC # BLD AUTO: 4.4 MIL/UL (ref 4.5–6)
SODIUM SERPL-SCNC: 139 MMOL/L (ref 136–145)
WBC NRBC COR # BLD AUTO: 6.1 K/UL (ref 4.3–11)

## 2019-09-22 PROCEDURE — 0JB70ZZ EXCISION OF BACK SUBCUTANEOUS TISSUE AND FASCIA, OPEN APPROACH: ICD-10-PCS

## 2019-09-22 RX ADMIN — VALSARTAN SCH MG: 80 TABLET ORAL at 09:46

## 2019-09-22 RX ADMIN — FAMOTIDINE SCH MG: 20 TABLET, FILM COATED ORAL at 09:46

## 2019-09-22 RX ADMIN — LINEZOLID SCH MG: 600 TABLET, FILM COATED ORAL at 09:45

## 2019-09-22 RX ADMIN — FOLIC ACID SCH MG: 1 TABLET ORAL at 09:46

## 2019-09-22 RX ADMIN — SODIUM HYPOCHLORITE SCH ML: 2.5 SOLUTION TOPICAL at 09:48

## 2019-09-22 RX ADMIN — OXYCODONE HYDROCHLORIDE AND ACETAMINOPHEN SCH MG: 500 TABLET ORAL at 09:45

## 2019-09-22 RX ADMIN — Medication SCH EACH: at 09:45

## 2019-09-22 RX ADMIN — POTASSIUM CHLORIDE SCH MEQ: 1500 TABLET, EXTENDED RELEASE ORAL at 09:44

## 2019-09-22 RX ADMIN — TEMAZEPAM PRN MG: 15 CAPSULE ORAL at 21:32

## 2019-09-22 RX ADMIN — LINEZOLID SCH MG: 600 TABLET, FILM COATED ORAL at 20:25

## 2019-09-22 RX ADMIN — VANCOMYCIN HYDROCHLORIDE SCH MG: 125 CAPSULE ORAL at 00:02

## 2019-09-22 RX ADMIN — POTASSIUM CHLORIDE SCH MEQ: 1500 TABLET, EXTENDED RELEASE ORAL at 13:03

## 2019-09-22 RX ADMIN — OXYCODONE HYDROCHLORIDE AND ACETAMINOPHEN SCH MG: 500 TABLET ORAL at 17:31

## 2019-09-22 RX ADMIN — SODIUM CHLORIDE PRN MLS/HR: 9 INJECTION, SOLUTION INTRAVENOUS at 02:36

## 2019-09-22 RX ADMIN — VANCOMYCIN HYDROCHLORIDE SCH MG: 125 CAPSULE ORAL at 17:31

## 2019-09-22 RX ADMIN — ALBUTEROL SULFATE SCH MG: 2.5 SOLUTION RESPIRATORY (INHALATION) at 01:30

## 2019-09-22 RX ADMIN — VALPROIC ACID SCH MG: 250 SOLUTION ORAL at 09:44

## 2019-09-22 RX ADMIN — Medication PRN MG: at 22:04

## 2019-09-22 RX ADMIN — POTASSIUM CHLORIDE SCH MEQ: 1500 TABLET, EXTENDED RELEASE ORAL at 11:05

## 2019-09-22 RX ADMIN — VANCOMYCIN HYDROCHLORIDE SCH MG: 125 CAPSULE ORAL at 05:36

## 2019-09-22 RX ADMIN — ALBUTEROL SULFATE SCH MG: 2.5 SOLUTION RESPIRATORY (INHALATION) at 12:33

## 2019-09-22 RX ADMIN — VALPROIC ACID SCH MG: 250 SOLUTION ORAL at 20:25

## 2019-09-22 RX ADMIN — ALBUTEROL SULFATE SCH MG: 2.5 SOLUTION RESPIRATORY (INHALATION) at 07:32

## 2019-09-22 RX ADMIN — VANCOMYCIN HYDROCHLORIDE SCH MG: 125 CAPSULE ORAL at 13:19

## 2019-09-22 RX ADMIN — VANCOMYCIN HYDROCHLORIDE SCH MG: 125 CAPSULE ORAL at 23:16

## 2019-09-22 RX ADMIN — ALBUTEROL SULFATE SCH MG: 2.5 SOLUTION RESPIRATORY (INHALATION) at 19:26

## 2019-09-22 RX ADMIN — FAMOTIDINE SCH MG: 20 TABLET, FILM COATED ORAL at 17:31

## 2019-09-22 RX ADMIN — BUPROPION HYDROCHLORIDE SCH MG: 100 TABLET, EXTENDED RELEASE ORAL at 09:46

## 2019-09-22 RX ADMIN — QUETIAPINE SCH MG: 25 TABLET, FILM COATED ORAL at 21:00

## 2019-09-22 RX ADMIN — TEMAZEPAM PRN MG: 15 CAPSULE ORAL at 02:03

## 2019-09-22 RX ADMIN — Medication SCH EACH: at 17:31

## 2019-09-23 VITALS — SYSTOLIC BLOOD PRESSURE: 139 MMHG | DIASTOLIC BLOOD PRESSURE: 78 MMHG

## 2019-09-23 LAB
BUN SERPL-MCNC: 6 MG/DL (ref 7–18)
CALCIUM SERPL-MCNC: 8.4 MG/DL (ref 8.5–10.1)
CHLORIDE SERPL-SCNC: 107 MMOL/L (ref 98–107)
CO2 SERPL-SCNC: 28 MMOL/L (ref 21–32)
CREAT SERPL-MCNC: 0.7 MG/DL (ref 0.6–1.3)
GLUCOSE SERPL-MCNC: 79 MG/DL (ref 74–106)
POTASSIUM SERPL-SCNC: 3.9 MMOL/L (ref 3.5–5.1)
SODIUM SERPL-SCNC: 143 MMOL/L (ref 136–145)

## 2019-09-23 RX ADMIN — SODIUM HYPOCHLORITE SCH ML: 2.5 SOLUTION TOPICAL at 09:03

## 2019-09-23 RX ADMIN — VALSARTAN SCH MG: 80 TABLET ORAL at 08:17

## 2019-09-23 RX ADMIN — Medication SCH EACH: at 08:17

## 2019-09-23 RX ADMIN — FAMOTIDINE SCH MG: 20 TABLET, FILM COATED ORAL at 08:17

## 2019-09-23 RX ADMIN — FAMOTIDINE SCH MG: 20 TABLET, FILM COATED ORAL at 16:21

## 2019-09-23 RX ADMIN — OXYCODONE HYDROCHLORIDE AND ACETAMINOPHEN SCH MG: 500 TABLET ORAL at 08:17

## 2019-09-23 RX ADMIN — FOLIC ACID SCH MG: 1 TABLET ORAL at 08:17

## 2019-09-23 RX ADMIN — Medication PRN MG: at 16:21

## 2019-09-23 RX ADMIN — ALBUTEROL SULFATE SCH MG: 2.5 SOLUTION RESPIRATORY (INHALATION) at 07:35

## 2019-09-23 RX ADMIN — VALPROIC ACID SCH MG: 250 SOLUTION ORAL at 08:17

## 2019-09-23 RX ADMIN — LINEZOLID SCH MG: 600 TABLET, FILM COATED ORAL at 08:17

## 2019-09-23 RX ADMIN — BUPROPION HYDROCHLORIDE SCH MG: 100 TABLET, EXTENDED RELEASE ORAL at 08:17

## 2019-09-23 RX ADMIN — OXYCODONE HYDROCHLORIDE AND ACETAMINOPHEN SCH MG: 500 TABLET ORAL at 16:21

## 2019-09-23 RX ADMIN — VANCOMYCIN HYDROCHLORIDE SCH MG: 125 CAPSULE ORAL at 15:23

## 2019-09-23 RX ADMIN — VANCOMYCIN HYDROCHLORIDE SCH MG: 125 CAPSULE ORAL at 05:55

## 2019-09-23 RX ADMIN — ALBUTEROL SULFATE SCH MG: 2.5 SOLUTION RESPIRATORY (INHALATION) at 13:30

## 2019-09-23 RX ADMIN — ALBUTEROL SULFATE SCH MG: 2.5 SOLUTION RESPIRATORY (INHALATION) at 01:30

## 2019-09-23 RX ADMIN — VANCOMYCIN HYDROCHLORIDE SCH MG: 125 CAPSULE ORAL at 17:20

## 2019-09-23 RX ADMIN — Medication SCH EACH: at 16:21
